# Patient Record
Sex: FEMALE | Race: WHITE | NOT HISPANIC OR LATINO | Employment: OTHER | URBAN - METROPOLITAN AREA
[De-identification: names, ages, dates, MRNs, and addresses within clinical notes are randomized per-mention and may not be internally consistent; named-entity substitution may affect disease eponyms.]

---

## 2023-03-22 LAB
ALANINE AMINOTRANSFERASE (SGPT) (U/L) IN SER/PLAS: 10 U/L (ref 7–45)
ALBUMIN (G/DL) IN SER/PLAS: 4.3 G/DL (ref 3.4–5)
ALKALINE PHOSPHATASE (U/L) IN SER/PLAS: 57 U/L (ref 33–136)
ANION GAP IN SER/PLAS: 11 MMOL/L (ref 10–20)
ASPARTATE AMINOTRANSFERASE (SGOT) (U/L) IN SER/PLAS: 13 U/L (ref 9–39)
BILIRUBIN TOTAL (MG/DL) IN SER/PLAS: 0.7 MG/DL (ref 0–1.2)
CALCIUM (MG/DL) IN SER/PLAS: 9.8 MG/DL (ref 8.6–10.6)
CARBON DIOXIDE, TOTAL (MMOL/L) IN SER/PLAS: 34 MMOL/L (ref 21–32)
CHLORIDE (MMOL/L) IN SER/PLAS: 102 MMOL/L (ref 98–107)
CHOLESTEROL (MG/DL) IN SER/PLAS: 169 MG/DL (ref 0–199)
CHOLESTEROL IN HDL (MG/DL) IN SER/PLAS: 54.4 MG/DL
CHOLESTEROL/HDL RATIO: 3.1
CREATININE (MG/DL) IN SER/PLAS: 0.64 MG/DL (ref 0.5–1.05)
ERYTHROCYTE DISTRIBUTION WIDTH (RATIO) BY AUTOMATED COUNT: 13.8 % (ref 11.5–14.5)
ERYTHROCYTE MEAN CORPUSCULAR HEMOGLOBIN CONCENTRATION (G/DL) BY AUTOMATED: 31.8 G/DL (ref 32–36)
ERYTHROCYTE MEAN CORPUSCULAR VOLUME (FL) BY AUTOMATED COUNT: 87 FL (ref 80–100)
ERYTHROCYTES (10*6/UL) IN BLOOD BY AUTOMATED COUNT: 5.4 X10E12/L (ref 4–5.2)
GFR FEMALE: 88 ML/MIN/1.73M2
GLUCOSE (MG/DL) IN SER/PLAS: 119 MG/DL (ref 74–99)
HEMATOCRIT (%) IN BLOOD BY AUTOMATED COUNT: 47.2 % (ref 36–46)
HEMOGLOBIN (G/DL) IN BLOOD: 15 G/DL (ref 12–16)
LDL: 64 MG/DL (ref 0–99)
LEUKOCYTES (10*3/UL) IN BLOOD BY AUTOMATED COUNT: 7.7 X10E9/L (ref 4.4–11.3)
NON HDL CHOLESTEROL: 115 MG/DL
NRBC (PER 100 WBCS) BY AUTOMATED COUNT: 0 /100 WBC (ref 0–0)
PLATELETS (10*3/UL) IN BLOOD AUTOMATED COUNT: 231 X10E9/L (ref 150–450)
POTASSIUM (MMOL/L) IN SER/PLAS: 4.3 MMOL/L (ref 3.5–5.3)
PROTEIN TOTAL: 6.4 G/DL (ref 6.4–8.2)
SODIUM (MMOL/L) IN SER/PLAS: 143 MMOL/L (ref 136–145)
TRIGLYCERIDE (MG/DL) IN SER/PLAS: 253 MG/DL (ref 0–149)
UREA NITROGEN (MG/DL) IN SER/PLAS: 11 MG/DL (ref 6–23)
VLDL: 51 MG/DL (ref 0–40)

## 2023-03-24 DIAGNOSIS — K21.00 GASTROESOPHAGEAL REFLUX DISEASE WITH ESOPHAGITIS, UNSPECIFIED WHETHER HEMORRHAGE: ICD-10-CM

## 2023-03-24 DIAGNOSIS — I10 HYPERTENSION, UNSPECIFIED TYPE: ICD-10-CM

## 2023-03-24 DIAGNOSIS — Z79.4 OTHER SPECIFIED DIABETES MELLITUS WITH OTHER SPECIFIED COMPLICATION, WITH LONG-TERM CURRENT USE OF INSULIN (MULTI): ICD-10-CM

## 2023-03-24 DIAGNOSIS — E78.5 HYPERLIPIDEMIA, UNSPECIFIED HYPERLIPIDEMIA TYPE: ICD-10-CM

## 2023-03-24 DIAGNOSIS — E13.69 OTHER SPECIFIED DIABETES MELLITUS WITH OTHER SPECIFIED COMPLICATION, WITH LONG-TERM CURRENT USE OF INSULIN (MULTI): ICD-10-CM

## 2023-03-24 DIAGNOSIS — F41.9 ANXIETY: ICD-10-CM

## 2023-03-24 RX ORDER — SIMVASTATIN 20 MG/1
1 TABLET, FILM COATED ORAL NIGHTLY
COMMUNITY
Start: 2012-12-11 | End: 2023-03-27

## 2023-03-24 RX ORDER — PANTOPRAZOLE SODIUM 40 MG/1
40 TABLET, DELAYED RELEASE ORAL 2 TIMES DAILY
Qty: 180 TABLET | Refills: 0 | Status: SHIPPED | OUTPATIENT
Start: 2023-03-24 | End: 2023-07-19 | Stop reason: SDUPTHER

## 2023-03-24 RX ORDER — PANTOPRAZOLE SODIUM 40 MG/1
1 TABLET, DELAYED RELEASE ORAL 2 TIMES DAILY
COMMUNITY
Start: 2021-05-03 | End: 2023-03-24 | Stop reason: SDUPTHER

## 2023-03-24 RX ORDER — CHOLECALCIFEROL (VITAMIN D3) 25 MCG
1 TABLET ORAL DAILY
COMMUNITY

## 2023-03-24 RX ORDER — ROSUVASTATIN CALCIUM 5 MG/1
1 TABLET, COATED ORAL DAILY
COMMUNITY
Start: 2022-10-06 | End: 2023-03-24 | Stop reason: SDUPTHER

## 2023-03-24 RX ORDER — HYDROXYZINE HYDROCHLORIDE 25 MG/1
25 TABLET, FILM COATED ORAL 3 TIMES DAILY PRN
Qty: 270 TABLET | Refills: 0 | Status: SHIPPED
Start: 2023-03-24 | End: 2023-07-19 | Stop reason: ALTCHOICE

## 2023-03-24 RX ORDER — GLUC/MSM/COLGN2/HYAL/ANTIARTH3 375-375-20
27 TABLET ORAL DAILY
COMMUNITY
End: 2023-03-24 | Stop reason: SDUPTHER

## 2023-03-24 RX ORDER — HYDROXYZINE HYDROCHLORIDE 25 MG/1
1 TABLET, FILM COATED ORAL 3 TIMES DAILY PRN
COMMUNITY
Start: 2020-11-02 | End: 2023-03-24 | Stop reason: SDUPTHER

## 2023-03-24 RX ORDER — LOSARTAN POTASSIUM 100 MG/1
100 TABLET ORAL DAILY
Qty: 90 TABLET | Refills: 0 | Status: SHIPPED | OUTPATIENT
Start: 2023-03-24 | End: 2023-07-19 | Stop reason: SDUPTHER

## 2023-03-24 RX ORDER — AMLODIPINE BESYLATE 10 MG/1
10 TABLET ORAL DAILY
Qty: 90 TABLET | Refills: 0 | Status: SHIPPED | OUTPATIENT
Start: 2023-03-24 | End: 2023-07-19 | Stop reason: SDUPTHER

## 2023-03-24 RX ORDER — ROSUVASTATIN CALCIUM 5 MG/1
5 TABLET, COATED ORAL DAILY
Qty: 90 TABLET | Refills: 0 | Status: SHIPPED | OUTPATIENT
Start: 2023-03-24 | End: 2023-07-19 | Stop reason: SDUPTHER

## 2023-03-24 RX ORDER — CELECOXIB 200 MG/1
1 CAPSULE ORAL DAILY PRN
COMMUNITY
Start: 2022-03-25 | End: 2023-07-19 | Stop reason: ALTCHOICE

## 2023-03-24 RX ORDER — SERTRALINE HYDROCHLORIDE 100 MG/1
1 TABLET, FILM COATED ORAL DAILY
COMMUNITY
Start: 2013-01-30 | End: 2023-07-19 | Stop reason: SDUPTHER

## 2023-03-24 RX ORDER — LOSARTAN POTASSIUM 100 MG/1
1 TABLET ORAL DAILY
COMMUNITY
Start: 2020-02-03 | End: 2023-03-24 | Stop reason: SDUPTHER

## 2023-03-24 RX ORDER — GLUC/MSM/COLGN2/HYAL/ANTIARTH3 375-375-20
27 TABLET ORAL DAILY
Qty: 90 TABLET | Refills: 0 | Status: SHIPPED | OUTPATIENT
Start: 2023-03-24 | End: 2023-06-22

## 2023-03-24 RX ORDER — AMLODIPINE BESYLATE 10 MG/1
10 TABLET ORAL DAILY
COMMUNITY
Start: 2023-01-30 | End: 2023-03-24 | Stop reason: SDUPTHER

## 2023-07-07 DIAGNOSIS — E78.5 HYPERLIPIDEMIA, UNSPECIFIED HYPERLIPIDEMIA TYPE: ICD-10-CM

## 2023-07-07 DIAGNOSIS — I10 HYPERTENSION, UNSPECIFIED TYPE: ICD-10-CM

## 2023-07-12 ENCOUNTER — LAB (OUTPATIENT)
Dept: LAB | Facility: LAB | Age: 83
End: 2023-07-12
Payer: MEDICARE

## 2023-07-12 DIAGNOSIS — E78.5 HYPERLIPIDEMIA, UNSPECIFIED HYPERLIPIDEMIA TYPE: ICD-10-CM

## 2023-07-12 DIAGNOSIS — I10 HYPERTENSION, UNSPECIFIED TYPE: ICD-10-CM

## 2023-07-12 LAB
ALANINE AMINOTRANSFERASE (SGPT) (U/L) IN SER/PLAS: 12 U/L (ref 7–45)
ALBUMIN (G/DL) IN SER/PLAS: 4.7 G/DL (ref 3.4–5)
ALKALINE PHOSPHATASE (U/L) IN SER/PLAS: 57 U/L (ref 33–136)
ANION GAP IN SER/PLAS: 11 MMOL/L (ref 10–20)
ASPARTATE AMINOTRANSFERASE (SGOT) (U/L) IN SER/PLAS: 16 U/L (ref 9–39)
BILIRUBIN TOTAL (MG/DL) IN SER/PLAS: 0.7 MG/DL (ref 0–1.2)
CALCIUM (MG/DL) IN SER/PLAS: 10.1 MG/DL (ref 8.6–10.6)
CARBON DIOXIDE, TOTAL (MMOL/L) IN SER/PLAS: 34 MMOL/L (ref 21–32)
CHLORIDE (MMOL/L) IN SER/PLAS: 101 MMOL/L (ref 98–107)
CHOLESTEROL (MG/DL) IN SER/PLAS: 168 MG/DL (ref 0–199)
CHOLESTEROL IN HDL (MG/DL) IN SER/PLAS: 60.1 MG/DL
CHOLESTEROL/HDL RATIO: 2.8
CREATININE (MG/DL) IN SER/PLAS: 0.58 MG/DL (ref 0.5–1.05)
ERYTHROCYTE DISTRIBUTION WIDTH (RATIO) BY AUTOMATED COUNT: 12.7 % (ref 11.5–14.5)
ERYTHROCYTE MEAN CORPUSCULAR HEMOGLOBIN CONCENTRATION (G/DL) BY AUTOMATED: 31 G/DL (ref 32–36)
ERYTHROCYTE MEAN CORPUSCULAR VOLUME (FL) BY AUTOMATED COUNT: 88 FL (ref 80–100)
ERYTHROCYTES (10*6/UL) IN BLOOD BY AUTOMATED COUNT: 5.75 X10E12/L (ref 4–5.2)
GFR FEMALE: 90 ML/MIN/1.73M2
GLUCOSE (MG/DL) IN SER/PLAS: 106 MG/DL (ref 74–99)
HEMATOCRIT (%) IN BLOOD BY AUTOMATED COUNT: 50.6 % (ref 36–46)
HEMOGLOBIN (G/DL) IN BLOOD: 15.7 G/DL (ref 12–16)
LDL: 67 MG/DL (ref 0–99)
LEUKOCYTES (10*3/UL) IN BLOOD BY AUTOMATED COUNT: 6.5 X10E9/L (ref 4.4–11.3)
NON HDL CHOLESTEROL: 108 MG/DL
NRBC (PER 100 WBCS) BY AUTOMATED COUNT: 0 /100 WBC (ref 0–0)
PLATELETS (10*3/UL) IN BLOOD AUTOMATED COUNT: 188 X10E9/L (ref 150–450)
POTASSIUM (MMOL/L) IN SER/PLAS: 4.6 MMOL/L (ref 3.5–5.3)
PROTEIN TOTAL: 6.8 G/DL (ref 6.4–8.2)
SODIUM (MMOL/L) IN SER/PLAS: 141 MMOL/L (ref 136–145)
TRIGLYCERIDE (MG/DL) IN SER/PLAS: 207 MG/DL (ref 0–149)
UREA NITROGEN (MG/DL) IN SER/PLAS: 15 MG/DL (ref 6–23)
VLDL: 41 MG/DL (ref 0–40)

## 2023-07-12 PROCEDURE — 80061 LIPID PANEL: CPT

## 2023-07-12 PROCEDURE — 80053 COMPREHEN METABOLIC PANEL: CPT

## 2023-07-12 PROCEDURE — 85027 COMPLETE CBC AUTOMATED: CPT

## 2023-07-12 PROCEDURE — 36415 COLL VENOUS BLD VENIPUNCTURE: CPT

## 2023-07-13 ENCOUNTER — TELEPHONE (OUTPATIENT)
Dept: PRIMARY CARE | Facility: CLINIC | Age: 83
End: 2023-07-13
Payer: MEDICARE

## 2023-07-19 ENCOUNTER — OFFICE VISIT (OUTPATIENT)
Dept: PRIMARY CARE | Facility: CLINIC | Age: 83
End: 2023-07-19
Payer: MEDICARE

## 2023-07-19 VITALS
BODY MASS INDEX: 28.47 KG/M2 | SYSTOLIC BLOOD PRESSURE: 134 MMHG | WEIGHT: 145 LBS | HEIGHT: 60 IN | DIASTOLIC BLOOD PRESSURE: 70 MMHG

## 2023-07-19 DIAGNOSIS — K21.00 GASTROESOPHAGEAL REFLUX DISEASE WITH ESOPHAGITIS, UNSPECIFIED WHETHER HEMORRHAGE: ICD-10-CM

## 2023-07-19 DIAGNOSIS — I10 HYPERTENSION, UNSPECIFIED TYPE: ICD-10-CM

## 2023-07-19 DIAGNOSIS — F41.9 ANXIETY: ICD-10-CM

## 2023-07-19 DIAGNOSIS — E78.5 HYPERLIPIDEMIA, UNSPECIFIED HYPERLIPIDEMIA TYPE: ICD-10-CM

## 2023-07-19 PROBLEM — N39.0 URINARY TRACT INFECTION: Status: ACTIVE | Noted: 2023-07-19

## 2023-07-19 PROBLEM — D64.9 ANEMIA: Status: ACTIVE | Noted: 2023-07-19

## 2023-07-19 PROBLEM — R10.9 ABDOMINAL PAIN: Status: ACTIVE | Noted: 2023-07-19

## 2023-07-19 PROBLEM — N32.81 OVERACTIVE BLADDER: Status: ACTIVE | Noted: 2023-07-19

## 2023-07-19 PROBLEM — R53.83 FATIGUE: Status: ACTIVE | Noted: 2023-07-19

## 2023-07-19 PROBLEM — E55.9 VITAMIN D DEFICIENCY: Status: ACTIVE | Noted: 2023-07-19

## 2023-07-19 PROBLEM — J18.9 PNEUMONIA: Status: ACTIVE | Noted: 2023-07-19

## 2023-07-19 PROBLEM — K64.9 HEMORRHOIDS: Status: ACTIVE | Noted: 2023-07-19

## 2023-07-19 PROBLEM — M25.559 HIP PAIN: Status: ACTIVE | Noted: 2023-07-19

## 2023-07-19 PROBLEM — M25.562 KNEE PAIN, LEFT: Status: ACTIVE | Noted: 2023-07-19

## 2023-07-19 PROBLEM — R35.0 URINE FREQUENCY: Status: ACTIVE | Noted: 2023-07-19

## 2023-07-19 PROBLEM — M89.9 DISORDER OF BONE AND ARTICULAR CARTILAGE: Status: ACTIVE | Noted: 2023-07-19

## 2023-07-19 PROBLEM — M25.512 LEFT SHOULDER PAIN: Status: ACTIVE | Noted: 2023-07-19

## 2023-07-19 PROBLEM — J44.9 COPD (CHRONIC OBSTRUCTIVE PULMONARY DISEASE) (MULTI): Status: ACTIVE | Noted: 2023-07-19

## 2023-07-19 PROBLEM — J32.9 SINUSITIS: Status: ACTIVE | Noted: 2023-07-19

## 2023-07-19 PROBLEM — R20.0 NUMBNESS AND TINGLING: Status: ACTIVE | Noted: 2023-07-19

## 2023-07-19 PROBLEM — M54.9 BACK PAIN WITH RADIATION: Status: ACTIVE | Noted: 2023-07-19

## 2023-07-19 PROBLEM — R05.9 COUGH: Status: ACTIVE | Noted: 2023-07-19

## 2023-07-19 PROBLEM — K63.5 SIGMOID POLYP: Status: ACTIVE | Noted: 2023-07-19

## 2023-07-19 PROBLEM — J02.9 SORETHROAT: Status: ACTIVE | Noted: 2023-07-19

## 2023-07-19 PROBLEM — J20.9 ACUTE BRONCHITIS: Status: ACTIVE | Noted: 2023-07-19

## 2023-07-19 PROBLEM — G51.39 HEMIFACIAL SPASM: Status: ACTIVE | Noted: 2023-07-19

## 2023-07-19 PROBLEM — R73.9 ELEVATED BLOOD SUGAR: Status: ACTIVE | Noted: 2023-07-19

## 2023-07-19 PROBLEM — L82.1 SEBORRHEIC KERATOSIS: Status: ACTIVE | Noted: 2023-07-19

## 2023-07-19 PROBLEM — K44.9 HIATAL HERNIA: Status: ACTIVE | Noted: 2023-07-19

## 2023-07-19 PROBLEM — M54.50 LOW BACK PAIN: Status: ACTIVE | Noted: 2023-07-19

## 2023-07-19 PROBLEM — D12.0 ADENOMA OF CECUM: Status: ACTIVE | Noted: 2023-07-19

## 2023-07-19 PROBLEM — Z86.2 H/O IRON DEFICIENCY ANEMIA: Status: ACTIVE | Noted: 2023-07-19

## 2023-07-19 PROBLEM — R20.2 NUMBNESS AND TINGLING: Status: ACTIVE | Noted: 2023-07-19

## 2023-07-19 PROBLEM — R25.3 EYELID TWITCH: Status: ACTIVE | Noted: 2023-07-19

## 2023-07-19 PROBLEM — E78.00 HYPERCHOLESTEROLEMIA: Status: ACTIVE | Noted: 2023-07-19

## 2023-07-19 PROBLEM — D50.0 IRON DEFICIENCY ANEMIA DUE TO CHRONIC BLOOD LOSS: Status: ACTIVE | Noted: 2023-07-19

## 2023-07-19 PROBLEM — R92.8 ABNORMAL MAMMOGRAM: Status: ACTIVE | Noted: 2023-07-19

## 2023-07-19 PROBLEM — M94.9 DISORDER OF BONE AND ARTICULAR CARTILAGE: Status: ACTIVE | Noted: 2023-07-19

## 2023-07-19 PROBLEM — R30.0 DYSURIA: Status: ACTIVE | Noted: 2023-07-19

## 2023-07-19 PROBLEM — N30.90 BLADDER INFECTION: Status: ACTIVE | Noted: 2023-07-19

## 2023-07-19 PROBLEM — L71.0 PERIORAL DERMATITIS: Status: ACTIVE | Noted: 2023-07-19

## 2023-07-19 PROBLEM — K92.1 BLOOD IN STOOL: Status: ACTIVE | Noted: 2023-07-19

## 2023-07-19 PROBLEM — G51.31 CLONIC HEMIFACIAL SPASM, RIGHT: Status: ACTIVE | Noted: 2023-07-19

## 2023-07-19 PROBLEM — R06.00 DYSPNEA: Status: ACTIVE | Noted: 2023-07-19

## 2023-07-19 PROBLEM — F32.A DEPRESSION: Status: ACTIVE | Noted: 2023-07-19

## 2023-07-19 PROBLEM — K21.9 GERD (GASTROESOPHAGEAL REFLUX DISEASE): Status: ACTIVE | Noted: 2023-07-19

## 2023-07-19 PROBLEM — G47.00 INSOMNIA: Status: ACTIVE | Noted: 2023-07-19

## 2023-07-19 PROCEDURE — 99213 OFFICE O/P EST LOW 20 MIN: CPT | Performed by: INTERNAL MEDICINE

## 2023-07-19 PROCEDURE — 1159F MED LIST DOCD IN RCRD: CPT | Performed by: INTERNAL MEDICINE

## 2023-07-19 PROCEDURE — 3075F SYST BP GE 130 - 139MM HG: CPT | Performed by: INTERNAL MEDICINE

## 2023-07-19 PROCEDURE — 3078F DIAST BP <80 MM HG: CPT | Performed by: INTERNAL MEDICINE

## 2023-07-19 RX ORDER — LOSARTAN POTASSIUM 100 MG/1
100 TABLET ORAL DAILY
Qty: 90 TABLET | Refills: 1 | Status: SHIPPED | OUTPATIENT
Start: 2023-07-19 | End: 2024-02-20 | Stop reason: SDUPTHER

## 2023-07-19 RX ORDER — PANTOPRAZOLE SODIUM 40 MG/1
40 TABLET, DELAYED RELEASE ORAL 2 TIMES DAILY
Qty: 180 TABLET | Refills: 1 | Status: SHIPPED | OUTPATIENT
Start: 2023-07-19 | End: 2024-02-20 | Stop reason: SDUPTHER

## 2023-07-19 RX ORDER — ROSUVASTATIN CALCIUM 5 MG/1
5 TABLET, COATED ORAL DAILY
Qty: 90 TABLET | Refills: 1 | Status: SHIPPED | OUTPATIENT
Start: 2023-07-19 | End: 2024-02-20 | Stop reason: SDUPTHER

## 2023-07-19 RX ORDER — ALPRAZOLAM 0.25 MG/1
0.25 TABLET ORAL 3 TIMES DAILY PRN
Qty: 30 TABLET | Refills: 0 | Status: SHIPPED | OUTPATIENT
Start: 2023-07-19 | End: 2024-03-15

## 2023-07-19 RX ORDER — AMLODIPINE BESYLATE 10 MG/1
10 TABLET ORAL DAILY
Qty: 90 TABLET | Refills: 1 | Status: SHIPPED | OUTPATIENT
Start: 2023-07-19 | End: 2024-02-20 | Stop reason: SDUPTHER

## 2023-07-19 RX ORDER — SERTRALINE HYDROCHLORIDE 100 MG/1
100 TABLET, FILM COATED ORAL DAILY
Qty: 90 TABLET | Refills: 1 | Status: SHIPPED | OUTPATIENT
Start: 2023-07-19 | End: 2024-02-20 | Stop reason: SDUPTHER

## 2023-07-19 NOTE — PROGRESS NOTES
Subjective   Patient ID: Slime Alonzo is a 82 y.o. female who presents for Follow-up (results).    HPI   Patient is here for follow-up  Follow-up on hypertension high cholesterol COPD  She is not using oxygen and wondering if she needs to use it   She does feel very tired fatigue   Doing better with anxiety     patient was hospitalized for pneumonia and COPD exacerbation  She is discharged on oxygen on exertion  Follow-up on hypertension and high cholesterol  Patient is asking for handicap sticker as she gets short of breath on exertion    Patient here for follow-up on blood work  Follow-up on hypertension high cholesterol COPD  She is having some breathing issues but not using the inhaler  past recap      Patient is here for follow-up on hypertension  Did blood work  Patient is very unhappy because of pandemic she is feeling very tired next and she has no motivation to move around  She has incontinence of bladder and overactive bladder but cannot afford the medication  She has COPD but does not do any breathing exercises And she does not use inhaler because they're expensive  She gets out of breath on exertion  Wants prescription for Xanax but does not want to sign for contract narcotic contract     Patient here for follow-up on hypertension  Wants refills on Xanax uses it very occasionally  Follow-up on blood work  Complaining of rash on the face on the eyelids  Could not afford oxybutynin so not taking it still having bladder issues  Patient still very upset about having the spasms on the right side of the face        Patient is here for follow-up did blood work. Questions about vitamin D. Blood pressure is running high recently. Runny nose. Leaky bladder she had sling surgery in the past but does not want to consider the surgery of the  Having tingling in the fingers she suspects carpal tunnel but she is refusing EMG  Getting some red blotches on the face  Left arm still hurts no trauma but she gets painful  when tries to raise it above shoulder  She had multiple colonoscopy to remove fundic all last one showed small for polyps  Son had CABG     pasr recap  Patient had seen the GI underwent colonoscopy found to have multiple large polyps she was referred to other gastroenterologist to remove 1 polyp but could not do the other so she was referred again to another specialist surgeon who referred her back to her different gastroenterologist she was able to remove the bigger polyp.  Pathology showed precancerous polyps  Patient has a follow-up in 6 months with the gastroenterologist  She is here for follow-up on blood work  Follow-up on hypertension COPD hyperlipidemia  She is also due for mammogram wants prescription for Xanax        PAST RECALL  Patient is here with concerns about having blood in the stool  She noticed a few blood clots small in the morning then she is noticing blood on the toilet paper she gets little constipated denies any abdominal pain  She had colonoscopy about 8 years ago which was normal  Here for follow-up on hypertension and COPD high cholesterol  Did blood work needs medication refill      Review of Systems    Objective   /70   Ht 1.524 m (5')   Wt 65.8 kg (145 lb)   BMI 28.32 kg/m²     Physical Exam  Vitals reviewed.   Constitutional:       Appearance: Normal appearance.   HENT:      Head: Normocephalic and atraumatic.      Right Ear: Tympanic membrane, ear canal and external ear normal.      Left Ear: Tympanic membrane, ear canal and external ear normal.      Nose: Nose normal.      Mouth/Throat:      Pharynx: Oropharynx is clear.   Eyes:      Extraocular Movements: Extraocular movements intact.      Conjunctiva/sclera: Conjunctivae normal.      Pupils: Pupils are equal, round, and reactive to light.   Cardiovascular:      Rate and Rhythm: Normal rate and regular rhythm.      Pulses: Normal pulses.      Heart sounds: Normal heart sounds.   Pulmonary:      Effort: Pulmonary effort is  normal.      Breath sounds: Normal breath sounds.   Abdominal:      General: Abdomen is flat. Bowel sounds are normal.      Palpations: Abdomen is soft.   Musculoskeletal:      Cervical back: Normal range of motion and neck supple.   Skin:     General: Skin is warm and dry.   Neurological:      General: No focal deficit present.      Mental Status: She is alert and oriented to person, place, and time.   Psychiatric:         Mood and Affect: Mood normal.       Assessment/Plan   Problem List Items Addressed This Visit          Cardiac and Vasculature    Hyperlipidemia    Relevant Medications    rosuvastatin (Crestor) 5 mg tablet    Other Relevant Orders    CBC    Comprehensive Metabolic Panel    Thyroid Stimulating Hormone    Lipid Panel       Gastrointestinal and Abdominal    GERD (gastroesophageal reflux disease)    Relevant Medications    pantoprazole (ProtoNix) 40 mg EC tablet    Other Relevant Orders    CBC    Comprehensive Metabolic Panel    Thyroid Stimulating Hormone    Lipid Panel       Mental Health    Anxiety    Relevant Medications    sertraline (Zoloft) 100 mg tablet    ALPRAZolam (Xanax) 0.25 mg tablet     Other Visit Diagnoses       Hypertension, unspecified type        Relevant Medications    amLODIPine (Norvasc) 10 mg tablet    losartan (Cozaar) 100 mg tablet    Other Relevant Orders    CBC    Comprehensive Metabolic Panel    Thyroid Stimulating Hormone    Lipid Panel          4/27  Narcotic contract and  Xanax occasional use  Patient used 30 tablets last year but wants more  30 tablets with 1 refill given  [facility] report done  Stat CAT scan ordered for abdomen and pelvis  Repeat CBC ordered because patient thinks that her anemia is probably a lab error  Patient has history of 2 big polyps in the colon  She may need to get the GI doctor again  Medications refilled  Encourage patient to do blood work in 3 months  She should also get evaluated for cardiac etiology because there is lots of atherosclerotic  changes in her artery  Follow-up after CAT scan     5/3  CAT scan results reviewed  Patient has large hiatal hernia  Anemia most likely related to hiatal hernia  Continue Protonix 40 mg twice a day  Urgent appointment made with GI for tomorrow  Patient needs EGD  Patient is very anxious all her questions answered     3/25  Stat x-ray of the knee done  Shows mild joint effusion  Possibly patient had meniscal tear  Refer to orthopedic will benefit from steroid injection  Recent blood work reviewed  Blood pressure stable  Cholesterol well controlled  Prescription for Xanax given for emergency use  [facility] report     10/6/22  Flu shot given  Blood work reviewed  This drop in H&H again  Patient does not want to pursue further  Start taking iron supplements  Advised patient to repeat the blood work in couple of weeks but she is not willing to  Left message with [place] [phone]  Added Spiriva  Flu shot given  Blood pressure is also very high  Patient says she gets nervous and anxious otherwise her blood pressure is good  Does not want to make changes  Follow-up in 3 months     Total time spent in visit more than 50 minutes more than 50% time in face-to-face counseling     2/17/23  Clinically patient is doing better  Pneumonia is improving  Handicap sticker given  Blood pressure is doing better  Blood work ordered CBC CMP fasting lipid  Follow-up in a month  Discussed lifestyle modification    7/19/2020    Clinically patient looks better  Blood work looks improved  Blood pressure is stable  She is upset about needing Botox shot  Her anxiety is and questions answered  Follow-up blood work in 6 months  Triglycerides little high discussed diet and exercise  Medications refilled

## 2023-07-31 DIAGNOSIS — R92.8 ABNORMAL MAMMOGRAM: ICD-10-CM

## 2023-07-31 DIAGNOSIS — Z12.31 ENCOUNTER FOR SCREENING MAMMOGRAM FOR BREAST CANCER: ICD-10-CM

## 2023-08-19 RX ORDER — HYDROXYZINE HYDROCHLORIDE 25 MG/1
25 TABLET, FILM COATED ORAL 3 TIMES DAILY PRN
COMMUNITY

## 2023-08-19 RX ORDER — ALBUTEROL SULFATE 90 UG/1
AEROSOL, METERED RESPIRATORY (INHALATION)
COMMUNITY

## 2023-08-19 RX ORDER — TIOTROPIUM BROMIDE INHALATION SPRAY 3.12 UG/1
2 SPRAY, METERED RESPIRATORY (INHALATION) DAILY
COMMUNITY

## 2023-08-19 RX ORDER — FERROUS SULFATE 325(65) MG
65 TABLET, DELAYED RELEASE (ENTERIC COATED) ORAL DAILY
COMMUNITY

## 2023-10-02 ENCOUNTER — PROCEDURE VISIT (OUTPATIENT)
Dept: NEUROLOGY | Facility: CLINIC | Age: 83
End: 2023-10-02
Payer: MEDICARE

## 2023-10-02 DIAGNOSIS — G51.31 CLONIC HEMIFACIAL SPASM, RIGHT: Primary | ICD-10-CM

## 2023-10-02 PROCEDURE — 64612 DESTROY NERVE FACE MUSCLE: CPT | Performed by: PSYCHIATRY & NEUROLOGY

## 2023-10-02 NOTE — PROGRESS NOTES
[facility] / BOTULINUM TOXIN CLINIC     Patient reports that the last injections worked better after pretarsal injections added. Wore off a few weeks ago. Has eye closure when she eats and right facial pulling. Previously had facial weakness from higher doses but no ptosis or upper face side effects. She is generally bothered by the facial weakness, but does not think it is any worse after her last botox injections.    Exam: R eye clonic spasms and R facial pulling      Procedure  Prior to the start of the procedure a time out was taken and the following were verified: the identity of the patient using two patient identifiers (name, ) - this was verified by the patient    The patient was prepped in the usual sterile fashion. OnabotulinumtoxinA (Botox) was injected as follows:    Total dose    Muscle    6 units right superomedial orbicularis oculi   6 units  right superolateral orbicularis oculi   10 units  right lateral orbicularis oculi   10 units  right inferolateral orbicularis oculi   10 units  right inferior orbicularis oculi   2 units right pretarsal (far medial on eyelid 1 unit and far lateral 1 unit)  0.5 unit right zygomaticus  1 unit right mentalis    Total amount of botulinum toxin used was 45.5 units.  Total amount discarded was 54.5 units.  Total billed was 100 units.    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 She will return in approximately 3 months for repeat injections.

## 2023-10-25 ENCOUNTER — TELEPHONE (OUTPATIENT)
Dept: SURGERY | Facility: CLINIC | Age: 83
End: 2023-10-25

## 2023-10-25 ENCOUNTER — TELEPHONE (OUTPATIENT)
Dept: SURGERY | Facility: CLINIC | Age: 83
End: 2023-10-25
Payer: MEDICARE

## 2023-10-25 DIAGNOSIS — K44.9 HIATAL HERNIA: ICD-10-CM

## 2023-11-03 ENCOUNTER — HOSPITAL ENCOUNTER (OUTPATIENT)
Dept: RADIOLOGY | Facility: HOSPITAL | Age: 83
Discharge: HOME | End: 2023-11-03
Payer: MEDICARE

## 2023-11-03 DIAGNOSIS — K44.9 HIATAL HERNIA: ICD-10-CM

## 2023-11-03 PROCEDURE — 3430000001 HC RX 343 DIAGNOSTIC RADIOPHARMACEUTICALS: Performed by: SURGERY

## 2023-11-03 PROCEDURE — 2500000001 HC RX 250 WO HCPCS SELF ADMINISTERED DRUGS (ALT 637 FOR MEDICARE OP): Performed by: SURGERY

## 2023-11-03 PROCEDURE — 74240 X-RAY XM UPR GI TRC 1CNTRST: CPT

## 2023-11-03 RX ADMIN — BARIUM SULFATE 200 G: 980 POWDER, FOR SUSPENSION ORAL at 09:54

## 2023-11-03 RX ADMIN — BARIUM SULFATE 100 ML: 0.6 SUSPENSION ORAL at 09:52

## 2023-11-03 RX ADMIN — ANTACID/ANTIFLATULENT 1 PACKET: 380; 550; 10; 10 GRANULE, EFFERVESCENT ORAL at 09:54

## 2023-11-09 NOTE — PROGRESS NOTES
Subjective   Patient ID: Slime Alonzo is a 83 y.o. female who presents for No chief complaint on file..  HPI  Grandson met you RJ interested in bariatric surgery dad Abhi is (son in law) here today son in [place] Slime/ Yohana(daughter)  NP [facility] REFERRAL DR. ADAMS  HT: 60 INCHES IDEAL WT: 130  UGI IN EPIC  Review of Systems     Allergy/Immunologic:          HIV / AIDS No.  Hepatitis A No.  Hepatitis B No.  Hepatitis C No.  Immunosuppressent drugs No.         HEENT:          Headache negative.         CARDIOLOGY:          History of Hyperlipidemia No.  Last stress test NEVER.  Last echocardiogram 2023.  Chest pain No.  High blood pressure YES.  Irregular heart beat No.  Known coronary artery disease No.  Pacemaker No.  Palpitations No.         RESPIRATORY:          Hx steroid use No.  ER visits or Hospitalizations for breathing problems No.  Sleep Apnea No.  CRUZ (dyspnea on exertion) YES.  Hx of Asthma/COPD No.         GASTROENTEROLOGY:          Peptic ulcer No, Last EGD N/A, Last UGI N/A.  Colonoscopy Last Colonoscopy N/A.  Heartburn YES.         ENDOCRINOLOGY:          Diabetes No.  Thyroid disorder No.         EXTREMITIES:          Varicose Veins No.  Stasis Ulcers No.  Ankle swelling YES.  Personal history DVT No.  Personal history PE No.  Personal history of other thrombolic events No.  Family history of VTE No.  Known genetic bleeding or clotting disorder No.         FEMALE REPRODUCTIVE:          Uterine fibroids No.  Ovarian Cyst No.  Infertility No.  Menstrual history HYSTERECTOMY         UROLOGY:          Kidney disease No.  Kidney stones No.  Previous UTIs No.  Urinary incontinence No.         MUSCULOSKELETAL:          Osteoporosis/Osteopenia No.  Arthritis YES  Joint pain YES.         SKIN:          Hidradenitis No.  Open skin wounds No.  Rosacea No.  Healing problems No.         PSYCHOLOGY:          Anxiety none.  Depression none.  Eating disorder denies.     Objective   Physical  Exam    Assessment/Plan

## 2023-11-10 ENCOUNTER — OFFICE VISIT (OUTPATIENT)
Dept: SURGERY | Facility: CLINIC | Age: 83
End: 2023-11-10
Payer: MEDICARE

## 2023-11-10 VITALS
HEIGHT: 60 IN | BODY MASS INDEX: 28.66 KG/M2 | DIASTOLIC BLOOD PRESSURE: 78 MMHG | HEART RATE: 69 BPM | SYSTOLIC BLOOD PRESSURE: 175 MMHG | WEIGHT: 146 LBS

## 2023-11-10 DIAGNOSIS — J44.9 CHRONIC OBSTRUCTIVE PULMONARY DISEASE, UNSPECIFIED COPD TYPE (MULTI): ICD-10-CM

## 2023-11-10 DIAGNOSIS — E78.00 HYPERCHOLESTEROLEMIA: ICD-10-CM

## 2023-11-10 DIAGNOSIS — K21.9 GASTROESOPHAGEAL REFLUX DISEASE, UNSPECIFIED WHETHER ESOPHAGITIS PRESENT: ICD-10-CM

## 2023-11-10 DIAGNOSIS — K44.9 PARAESOPHAGEAL HERNIA: Primary | ICD-10-CM

## 2023-11-10 DIAGNOSIS — I10 BENIGN ESSENTIAL HYPERTENSION: ICD-10-CM

## 2023-11-10 PROCEDURE — 1159F MED LIST DOCD IN RCRD: CPT | Performed by: SURGERY

## 2023-11-10 PROCEDURE — 1126F AMNT PAIN NOTED NONE PRSNT: CPT | Performed by: SURGERY

## 2023-11-10 PROCEDURE — 3077F SYST BP >= 140 MM HG: CPT | Performed by: SURGERY

## 2023-11-10 PROCEDURE — 1036F TOBACCO NON-USER: CPT | Performed by: SURGERY

## 2023-11-10 PROCEDURE — 3078F DIAST BP <80 MM HG: CPT | Performed by: SURGERY

## 2023-11-10 PROCEDURE — 99205 OFFICE O/P NEW HI 60 MIN: CPT | Performed by: SURGERY

## 2023-11-10 ASSESSMENT — LIFESTYLE VARIABLES
HOW OFTEN DO YOU HAVE A DRINK CONTAINING ALCOHOL: MONTHLY OR LESS
HOW OFTEN DO YOU HAVE SIX OR MORE DRINKS ON ONE OCCASION: NEVER
SKIP TO QUESTIONS 9-10: 1
HOW MANY STANDARD DRINKS CONTAINING ALCOHOL DO YOU HAVE ON A TYPICAL DAY: 1 OR 2
AUDIT-C TOTAL SCORE: 1

## 2023-11-10 ASSESSMENT — PATIENT HEALTH QUESTIONNAIRE - PHQ9
1. LITTLE INTEREST OR PLEASURE IN DOING THINGS: NOT AT ALL
SUM OF ALL RESPONSES TO PHQ9 QUESTIONS 1 AND 2: 0
2. FEELING DOWN, DEPRESSED OR HOPELESS: NOT AT ALL

## 2023-11-10 ASSESSMENT — COLUMBIA-SUICIDE SEVERITY RATING SCALE - C-SSRS
1. IN THE PAST MONTH, HAVE YOU WISHED YOU WERE DEAD OR WISHED YOU COULD GO TO SLEEP AND NOT WAKE UP?: NO
2. HAVE YOU ACTUALLY HAD ANY THOUGHTS OF KILLING YOURSELF?: NO
6. HAVE YOU EVER DONE ANYTHING, STARTED TO DO ANYTHING, OR PREPARED TO DO ANYTHING TO END YOUR LIFE?: NO

## 2023-11-10 ASSESSMENT — PAIN SCALES - GENERAL: PAINLEVEL: 0-NO PAIN

## 2023-11-10 ASSESSMENT — ENCOUNTER SYMPTOMS
DEPRESSION: 0
LOSS OF SENSATION IN FEET: 0
OCCASIONAL FEELINGS OF UNSTEADINESS: 0

## 2023-11-10 NOTE — H&P
History Of Present Illness  Slime Alonzo is a 83 y.o. woman referred for a large paraesophageal hernia.  Slime has had reflux for at least 15 years.  She would vomit in attempt to relieve her symptoms.  She has had progression of her symptoms.  She had an EGD 2 years ago for anemia.  She was found to have a hiatal hernia.  She was told to take a ppi which she did not.  Her symptoms progressed and she started taking her ppi a year ago.  She has breakthrough symptoms once a month and will take a second ppi.  She has no nocturnal symptoms.  She does not regurgitate her foods.  She has no dysphagia to foods.  She had an upper GI for this visit.  I reviewed the images with Slime and her family.  Reviewed the upper endoscopy that she had by Dr. Lowe on May 27, 2021.     Past Medical History  Past Medical History:   Diagnosis Date    Depression, unspecified 06/03/2015    Depression    Essential (primary) hypertension 10/29/2022    Benign essential hypertension    Personal history of other diseases of the respiratory system     History of chronic obstructive lung disease    Personal history of other medical treatment 07/15/2019    History of screening mammography    Personal history of other medical treatment 05/26/2018    History of screening mammography    Pure hypercholesterolemia, unspecified 10/06/2022    Hypercholesterolemia       Surgical History  Past Surgical History:   Procedure Laterality Date    BREAST BIOPSY  10/12/2016    Biopsy Breast Open    HYSTERECTOMY  05/16/2013    Hysterectomy    OTHER SURGICAL HISTORY  10/12/2016    Abdominal Surgery        Social History  She has no history on file for tobacco use, alcohol use, and drug use.    Family History  Family History   Problem Relation Name Age of Onset    Other (laryngeal cancer) Mother      Lung disease Father      Stroke Brother          Allergies  Patient has no known allergies.    Review of Systems    Allergy/Immunologic:          HIV / AIDS  No.  Hepatitis A No.  Hepatitis B No.  Hepatitis C No.  Immunosuppressent drugs No.         HEENT:          Headache negative.         CARDIOLOGY:          History of Hyperlipidemia No.  Last stress test NEVER.  Last echocardiogram 2023.  Chest pain No.  High blood pressure YES.  Irregular heart beat No.  Known coronary artery disease No.  Pacemaker No.  Palpitations No.         RESPIRATORY:          Hx steroid use No.  ER visits or Hospitalizations for breathing problems No.  Sleep Apnea No.  CRUZ (dyspnea on exertion) YES.  Hx of Asthma/COPD No.         GASTROENTEROLOGY:          Peptic ulcer No, Last EGD N/A, Last UGI N/A.  Colonoscopy Last Colonoscopy N/A.  Heartburn YES.         ENDOCRINOLOGY:          Diabetes No.  Thyroid disorder No.         EXTREMITIES:          Varicose Veins No.  Stasis Ulcers No.  Ankle swelling YES.  Personal history DVT No.  Personal history PE No.  Personal history of other thrombolic events No.  Family history of VTE No.  Known genetic bleeding or clotting disorder No.         FEMALE REPRODUCTIVE:          Uterine fibroids No.  Ovarian Cyst No.  Infertility No.  Menstrual history HYSTERECTOMY         UROLOGY:          Kidney disease No.  Kidney stones No.  Previous UTIs No.  Urinary incontinence No.         MUSCULOSKELETAL:          Osteoporosis/Osteopenia No.  Arthritis YES  Joint pain YES.         SKIN:          Hidradenitis No.  Open skin wounds No.  Rosacea No.  Healing problems No.         PSYCHOLOGY:          Anxiety none.  Depression none.  Eating disorder denies.    Physical Exam       General Examination:         GENERAL APPEARANCE: alert and oriented x 3, Pleasant and cooperative, No Acute Distress.          HEENT: PERRLA.          NECK: no lymphadenopathy, no thyromegaly, no JVD, normal flexion, normal extension.          HEART: regular rate and rhythm.          LUNGS: clear to auscultation bilaterally.          CHEST: normal shape and expansion.          ABDOMEN: lower  midline scar from prev laparotomy, no hernias present, soft and not tender, no guarding, no CVA tenderness.          EXTREMITIES: pulses 2 plus bilaterally, trace bilateral edema, no ulcerations.             Last Recorded Vitals  Blood pressure 175/78, pulse 69, height 1.524 m (5'), weight 66.2 kg (146 lb).    Relevant Results  FL upper GI w KUB    Addendum Date: 11/3/2023    Interpreted By:  Moris Holcomb, ADDENDUM: Images: 12 spots, 6 series   Dose: 129.1 mGy air kerma   Signed by: Moris Holcomb 11/3/2023 10:47 AM   -------- ORIGINAL REPORT -------- Dictation workstation:   [id number]    Result Date: 11/3/2023  Interpreted By:  Moris Holcomb, STUDY: FL UPPER GI W KUB;  11/3/2023 9:45 am   INDICATION: Signs/Symptoms:hiatal hernia. 83-year-old woman with history of hiatal hernia.   COMPARISON: CT chest 01/25/2023   ACCESSION NUMBER(S): [id number]   ORDERING CLINICIAN: ADITYA FIGUEROA   TECHNIQUE: An initial radiograph of the abdomen and pelvis was obtained.  This was followed by double contrast upper GI study without small-bowel follow-through. Multiple dynamic and static fluoroscopic images of the esophagus, stomach and duodenum were obtained.   FINDINGS:  KUB: Initial  image demonstrates  no abnormally dilated loops of large or small bowel. Free air is not excluded on the basis of this examination. Incidentally noted aortic vascular calcifications. There are degenerative changes of the lumbar spine and bilateral hips with mild lumbar scoliosis.   Upper GI: Fluoroscopic evaluation of the cervical esophagus showed no sign of aspiration or penetration of thick barium.   Fluoroscopic and radiographic evaluation of the thoracic esophagus shows normal esophageal distensibility. There is decreased primary peristalsis with prominent tertiary contractions identified spontaneously in the esophagus. Multiple swallows are required to propel each bolus into the stomach via a patent gastroesophageal  junction. Gastroesophageal reflux is seen to the level of the upper esophagus, both spontaneously and with Valsalva maneuver. There is a moderate-to-large hiatal hernia measuring up to 7.3 x 7.3 cm on the current study.   The distal stomach and proximal duodenum are grossly unremarkable in appearance.         1. Presbyesophagus with decreased primary peristalsis and prominent tertiary contractions. Multiple swallows are required to propel each bolus into the stomach via the patent gastroesophageal junction.   2. Moderate-to-large hiatal hernia measuring up to 7.3 x 7.3 cm.   3. Gastroesophageal reflux is seen up to the level of the upper esophagus, both spontaneously and with Valsalva maneuver.   MACRO: None   Signed by: Moris Holcomb 11/3/2023 10:31 AM Dictation workstation:   [id number]         Assessment/Plan   Problem List Items Addressed This Visit             ICD-10-CM       Cardiac and Vasculature    Benign essential hypertension I10    Hypercholesterolemia E78.00       Gastrointestinal and Abdominal    GERD (gastroesophageal reflux disease) K21.9    Paraesophageal hernia - Primary K44.9       Pulmonary and Pneumonias    COPD (chronic obstructive pulmonary disease) (CMS/[facility]) J44.9       I offered Slime a laparoscopic paraesophageal hernia repair with absorbable mesh reinforcement of crural repair followed by a fundoplication.  She asked me if she had to have the surgery.  I explained to her that this is elective surgery based on her symptoms.  I explained that it would be her decision on whether or not she proceeded with surgery to improve the quality of her life and decrease the likelihood of her having an aspiration pneumonia.  I explained that at her age she was at increased risk for perioperative complications.  Does have a history of COPD if she has a 45-year smoking history, as well as a history of hypertension and hypercholesterolemia.  She and her children asked me questions all the questions  to the best of my ability.  We did discuss the operative risks.  We discussed my experience with the surgery.  Discussed that this procedure is not uncommon in her age group.  Will call with any additional questions.       I spent 60 minutes in the professional and overall care of this patient.      Amor Conner MD

## 2023-11-30 ENCOUNTER — TELEPHONE (OUTPATIENT)
Dept: PRIMARY CARE | Facility: CLINIC | Age: 83
End: 2023-11-30
Payer: MEDICARE

## 2023-11-30 NOTE — TELEPHONE ENCOUNTER
----- Message from Reina Correia CMA sent at 11/29/2023 11:27 AM EST -----  Regarding: oxygen  Patient called regarding needing a new script for [facility]. I called and left her a voicemail to return our call. It looks like she needs a follow up. Please call her

## 2024-01-08 ENCOUNTER — PROCEDURE VISIT (OUTPATIENT)
Dept: NEUROLOGY | Facility: CLINIC | Age: 84
End: 2024-01-08
Payer: MEDICARE

## 2024-01-08 VITALS — WEIGHT: 148 LBS | BODY MASS INDEX: 28.9 KG/M2

## 2024-01-08 DIAGNOSIS — G24.4 MEIGE SYNDROME (BLEPHAROSPASM WITH OROMANDIBULAR DYSTONIA): ICD-10-CM

## 2024-01-08 DIAGNOSIS — G51.31 CLONIC HEMIFACIAL SPASM, RIGHT: Primary | ICD-10-CM

## 2024-01-08 PROCEDURE — 64612 DESTROY NERVE FACE MUSCLE: CPT | Performed by: PSYCHIATRY & NEUROLOGY

## 2024-01-08 NOTE — PROGRESS NOTES
Subjective   Slime Alonzo is an 83 y.o. female here for medical botulinum injection for hemifacial spasm.    MOVEMENT DISORDERS CENTER / BOTULINUM TOXIN CLINIC     Patient reports that the last injections worked better after pretarsal injections added. Wore off a few weeks ago. Has eye closure when she eats and right facial pulling. Previously had facial weakness from higher doses but no ptosis or upper face side effects. She is generally bothered by the facial weakness, but does not think it is any worse after her last botox injections.  Nevertheless, she decided to skip the zygomaticus injection today to see if it makes a difference.  We had a long discussion.    Objective   Neurological Exam    Exam: R eye clonic spasms and R facial pulling   Physical Exam  Procedures    informed consent:  The risks, benefits, and alternatives of onabotulinumtoxinA (Botox) injection were discussed.  The risks that were discussed include bleeding, infection, damage to local structures, over-weakness or under-weakness including ptosis, facial droop, or diplopia, dysphagia, and rare incidents of systemic side effects including dysphagia or allergic reaction.  The alternatives that were discussed include no treatment at all.  The patient gave written informed consent for this procedure    Prior to the start of the procedure a time out was taken and the following were verified: the identity of the patient using two patient identifiers (name, ) - this was verified by the patient    The patient was prepped in the usual sterile fashion. OnabotulinumtoxinA (Botox) was injected as follows:    Total dose    Muscle    6 units right superomedial orbicularis oculi   6 units  right superolateral orbicularis oculi   10 units  right lateral orbicularis oculi   10 units  right inferolateral orbicularis oculi   10 units  right inferior orbicularis oculi   2 units right pretarsal (far medial on eyelid 1 unit and far lateral 1 unit)  0 unit  right zygomaticus (-0.5)  1 unit right mentalis    Total amount of botulinum toxin used was 45 units.  Total amount discarded was 55 units.  Total billed was 100 units.    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 She will return in approximately 3 months for repeat injections.

## 2024-02-06 ENCOUNTER — LAB (OUTPATIENT)
Dept: LAB | Facility: LAB | Age: 84
End: 2024-02-06
Payer: MEDICARE

## 2024-02-06 DIAGNOSIS — K21.00 GASTROESOPHAGEAL REFLUX DISEASE WITH ESOPHAGITIS, UNSPECIFIED WHETHER HEMORRHAGE: ICD-10-CM

## 2024-02-06 DIAGNOSIS — I10 HYPERTENSION, UNSPECIFIED TYPE: ICD-10-CM

## 2024-02-06 DIAGNOSIS — E78.5 HYPERLIPIDEMIA, UNSPECIFIED HYPERLIPIDEMIA TYPE: ICD-10-CM

## 2024-02-06 LAB — TSH SERPL-ACNC: 2.16 MIU/L (ref 0.44–3.98)

## 2024-02-06 PROCEDURE — 36415 COLL VENOUS BLD VENIPUNCTURE: CPT

## 2024-02-06 PROCEDURE — 80053 COMPREHEN METABOLIC PANEL: CPT

## 2024-02-06 PROCEDURE — 85027 COMPLETE CBC AUTOMATED: CPT

## 2024-02-06 PROCEDURE — 84443 ASSAY THYROID STIM HORMONE: CPT

## 2024-02-06 PROCEDURE — 80061 LIPID PANEL: CPT

## 2024-02-07 LAB
ALBUMIN SERPL BCP-MCNC: 4.7 G/DL (ref 3.4–5)
ALP SERPL-CCNC: 51 U/L (ref 33–136)
ALT SERPL W P-5'-P-CCNC: 12 U/L (ref 7–45)
ANION GAP SERPL CALC-SCNC: 11 MMOL/L (ref 10–20)
AST SERPL W P-5'-P-CCNC: 13 U/L (ref 9–39)
BILIRUB SERPL-MCNC: 0.6 MG/DL (ref 0–1.2)
BUN SERPL-MCNC: 14 MG/DL (ref 6–23)
CALCIUM SERPL-MCNC: 9.9 MG/DL (ref 8.6–10.6)
CHLORIDE SERPL-SCNC: 103 MMOL/L (ref 98–107)
CHOLEST SERPL-MCNC: 163 MG/DL (ref 0–199)
CHOLESTEROL/HDL RATIO: 2.7
CO2 SERPL-SCNC: 33 MMOL/L (ref 21–32)
CREAT SERPL-MCNC: 0.64 MG/DL (ref 0.5–1.05)
EGFRCR SERPLBLD CKD-EPI 2021: 88 ML/MIN/1.73M*2
ERYTHROCYTE [DISTWIDTH] IN BLOOD BY AUTOMATED COUNT: 12.8 % (ref 11.5–14.5)
GLUCOSE SERPL-MCNC: 110 MG/DL (ref 74–99)
HCT VFR BLD AUTO: 47.9 % (ref 36–46)
HDLC SERPL-MCNC: 59.8 MG/DL
HGB BLD-MCNC: 15.3 G/DL (ref 12–16)
LDLC SERPL CALC-MCNC: 61 MG/DL
MCH RBC QN AUTO: 28.2 PG (ref 26–34)
MCHC RBC AUTO-ENTMCNC: 31.9 G/DL (ref 32–36)
MCV RBC AUTO: 88 FL (ref 80–100)
NON HDL CHOLESTEROL: 103 MG/DL (ref 0–149)
NRBC BLD-RTO: 0 /100 WBCS (ref 0–0)
PLATELET # BLD AUTO: 171 X10*3/UL (ref 150–450)
POTASSIUM SERPL-SCNC: 4.2 MMOL/L (ref 3.5–5.3)
PROT SERPL-MCNC: 6.6 G/DL (ref 6.4–8.2)
RBC # BLD AUTO: 5.43 X10*6/UL (ref 4–5.2)
SODIUM SERPL-SCNC: 143 MMOL/L (ref 136–145)
TRIGL SERPL-MCNC: 209 MG/DL (ref 0–149)
VLDL: 42 MG/DL (ref 0–40)
WBC # BLD AUTO: 6 X10*3/UL (ref 4.4–11.3)

## 2024-02-20 ENCOUNTER — OFFICE VISIT (OUTPATIENT)
Dept: PRIMARY CARE | Facility: CLINIC | Age: 84
End: 2024-02-20
Payer: MEDICARE

## 2024-02-20 VITALS
HEIGHT: 61 IN | BODY MASS INDEX: 28.47 KG/M2 | SYSTOLIC BLOOD PRESSURE: 134 MMHG | DIASTOLIC BLOOD PRESSURE: 66 MMHG | WEIGHT: 150.8 LBS

## 2024-02-20 DIAGNOSIS — K21.00 GASTROESOPHAGEAL REFLUX DISEASE WITH ESOPHAGITIS, UNSPECIFIED WHETHER HEMORRHAGE: ICD-10-CM

## 2024-02-20 DIAGNOSIS — F41.9 ANXIETY: ICD-10-CM

## 2024-02-20 DIAGNOSIS — E78.5 HYPERLIPIDEMIA, UNSPECIFIED HYPERLIPIDEMIA TYPE: ICD-10-CM

## 2024-02-20 DIAGNOSIS — I10 HYPERTENSION, UNSPECIFIED TYPE: ICD-10-CM

## 2024-02-20 PROCEDURE — 3075F SYST BP GE 130 - 139MM HG: CPT | Performed by: INTERNAL MEDICINE

## 2024-02-20 PROCEDURE — 99214 OFFICE O/P EST MOD 30 MIN: CPT | Performed by: INTERNAL MEDICINE

## 2024-02-20 PROCEDURE — 1159F MED LIST DOCD IN RCRD: CPT | Performed by: INTERNAL MEDICINE

## 2024-02-20 PROCEDURE — 3078F DIAST BP <80 MM HG: CPT | Performed by: INTERNAL MEDICINE

## 2024-02-20 PROCEDURE — 1036F TOBACCO NON-USER: CPT | Performed by: INTERNAL MEDICINE

## 2024-02-20 PROCEDURE — 1126F AMNT PAIN NOTED NONE PRSNT: CPT | Performed by: INTERNAL MEDICINE

## 2024-02-20 RX ORDER — SERTRALINE HYDROCHLORIDE 100 MG/1
100 TABLET, FILM COATED ORAL DAILY
Qty: 90 TABLET | Refills: 1 | Status: SHIPPED | OUTPATIENT
Start: 2024-02-20

## 2024-02-20 RX ORDER — LOSARTAN POTASSIUM 100 MG/1
100 TABLET ORAL DAILY
Qty: 90 TABLET | Refills: 1 | Status: SHIPPED | OUTPATIENT
Start: 2024-02-20 | End: 2024-08-18

## 2024-02-20 RX ORDER — ROSUVASTATIN CALCIUM 5 MG/1
5 TABLET, COATED ORAL DAILY
Qty: 90 TABLET | Refills: 1 | Status: SHIPPED | OUTPATIENT
Start: 2024-02-20 | End: 2024-08-18

## 2024-02-20 RX ORDER — PANTOPRAZOLE SODIUM 40 MG/1
40 TABLET, DELAYED RELEASE ORAL 2 TIMES DAILY
Qty: 180 TABLET | Refills: 1 | Status: SHIPPED | OUTPATIENT
Start: 2024-02-20 | End: 2024-08-18

## 2024-02-20 RX ORDER — AMLODIPINE BESYLATE 10 MG/1
10 TABLET ORAL DAILY
Qty: 90 TABLET | Refills: 1 | Status: SHIPPED | OUTPATIENT
Start: 2024-02-20 | End: 2024-08-18

## 2024-02-20 ASSESSMENT — ENCOUNTER SYMPTOMS
OCCASIONAL FEELINGS OF UNSTEADINESS: 0
LOSS OF SENSATION IN FEET: 0
DEPRESSION: 0

## 2024-02-20 NOTE — PROGRESS NOTES
Subjective   Patient ID: Slime Alonzo is a 83 y.o. female who presents for Med Refill.    Med Refill       Patient is here for follow-up  Follow-up on hypertension high cholesterol COPD  She is not using oxygen and wondering if she needs to use it   She does feel very tired fatigue   Doing better with anxiety     patient was hospitalized for pneumonia and COPD exacerbation  She is discharged on oxygen on exertion  Follow-up on hypertension and high cholesterol  Patient is asking for handicap sticker as she gets short of breath on exertion    Patient here for follow-up on blood work  Follow-up on hypertension high cholesterol COPD  She is having some breathing issues but not using the inhaler  past recap      Patient is here for follow-up on hypertension  Did blood work  Patient is very unhappy because of pandemic she is feeling very tired next and she has no motivation to move around  She has incontinence of bladder and overactive bladder but cannot afford the medication  She has COPD but does not do any breathing exercises And she does not use inhaler because they're expensive  She gets out of breath on exertion  Wants prescription for Xanax but does not want to sign for contract narcotic contract     Patient here for follow-up on hypertension  Wants refills on Xanax uses it very occasionally  Follow-up on blood work  Complaining of rash on the face on the eyelids  Could not afford oxybutynin so not taking it still having bladder issues  Patient still very upset about having the spasms on the right side of the face        Patient is here for follow-up did blood work. Questions about vitamin D. Blood pressure is running high recently. Runny nose. Leaky bladder she had sling surgery in the past but does not want to consider the surgery of the  Having tingling in the fingers she suspects carpal tunnel but she is refusing EMG  Getting some red blotches on the face  Left arm still hurts no trauma but she gets  "painful when tries to raise it above shoulder  She had multiple colonoscopy to remove fundic all last one showed small for polyps  Son had CABG     pasr recap  Patient had seen the GI underwent colonoscopy found to have multiple large polyps she was referred to other gastroenterologist to remove 1 polyp but could not do the other so she was referred again to another specialist surgeon who referred her back to her different gastroenterologist she was able to remove the bigger polyp.  Pathology showed precancerous polyps  Patient has a follow-up in 6 months with the gastroenterologist  She is here for follow-up on blood work  Follow-up on hypertension COPD hyperlipidemia  She is also due for mammogram wants prescription for Xanax        PAST RECALL  Patient is here with concerns about having blood in the stool  She noticed a few blood clots small in the morning then she is noticing blood on the toilet paper she gets little constipated denies any abdominal pain  She had colonoscopy about 8 years ago which was normal  Here for follow-up on hypertension and COPD high cholesterol  Did blood work needs medication refill      Review of Systems    Objective   /66   Ht 1.549 m (5' 1\")   Wt 68.4 kg (150 lb 12.8 oz)   BMI 28.49 kg/m²     Physical Exam  Vitals reviewed.   Constitutional:       Appearance: Normal appearance.   HENT:      Head: Normocephalic and atraumatic.      Right Ear: Tympanic membrane, ear canal and external ear normal.      Left Ear: Tympanic membrane, ear canal and external ear normal.      Nose: Nose normal.      Mouth/Throat:      Pharynx: Oropharynx is clear.   Eyes:      Extraocular Movements: Extraocular movements intact.      Conjunctiva/sclera: Conjunctivae normal.      Pupils: Pupils are equal, round, and reactive to light.   Cardiovascular:      Rate and Rhythm: Normal rate and regular rhythm.      Pulses: Normal pulses.      Heart sounds: Normal heart sounds.   Pulmonary:      Effort: " Pulmonary effort is normal.      Breath sounds: Normal breath sounds.   Abdominal:      General: Abdomen is flat. Bowel sounds are normal.      Palpations: Abdomen is soft.   Musculoskeletal:      Cervical back: Normal range of motion and neck supple.   Skin:     General: Skin is warm and dry.   Neurological:      General: No focal deficit present.      Mental Status: She is alert and oriented to person, place, and time.   Psychiatric:         Mood and Affect: Mood normal.         Assessment/Plan   Problem List Items Addressed This Visit          Cardiac and Vasculature    Hyperlipidemia    Relevant Medications    rosuvastatin (Crestor) 5 mg tablet    Other Relevant Orders    CBC    Comprehensive Metabolic Panel    Lipid Panel    Thyroid Stimulating Hormone       Gastrointestinal and Abdominal    GERD (gastroesophageal reflux disease)    Relevant Medications    pantoprazole (ProtoNix) 40 mg EC tablet    Other Relevant Orders    CBC    Comprehensive Metabolic Panel    Lipid Panel    Thyroid Stimulating Hormone       Mental Health    Anxiety    Relevant Medications    sertraline (Zoloft) 100 mg tablet    Other Relevant Orders    CBC    Comprehensive Metabolic Panel    Lipid Panel    Thyroid Stimulating Hormone     Other Visit Diagnoses       Hypertension, unspecified type        Relevant Medications    amLODIPine (Norvasc) 10 mg tablet    losartan (Cozaar) 100 mg tablet    Other Relevant Orders    CBC    Comprehensive Metabolic Panel    Lipid Panel    Thyroid Stimulating Hormone        4/27  Narcotic contract and  Xanax occasional use  Patient used 30 tablets last year but wants more  30 tablets with 1 refill given  [facility] report done  Stat CAT scan ordered for abdomen and pelvis  Repeat CBC ordered because patient thinks that her anemia is probably a lab error  Patient has history of 2 big polyps in the colon  She may need to get the GI doctor again  Medications refilled  Encourage patient to do blood work in 3  months  She should also get evaluated for cardiac etiology because there is lots of atherosclerotic changes in her artery  Follow-up after CAT scan     5/3  CAT scan results reviewed  Patient has large hiatal hernia  Anemia most likely related to hiatal hernia  Continue Protonix 40 mg twice a day  Urgent appointment made with GI for tomorrow  Patient needs EGD  Patient is very anxious all her questions answered     3/25  Stat x-ray of the knee done  Shows mild joint effusion  Possibly patient had meniscal tear  Refer to orthopedic will benefit from steroid injection  Recent blood work reviewed  Blood pressure stable  Cholesterol well controlled  Prescription for Xanax given for emergency use  [facility] report     10/6/22  Flu shot given  Blood work reviewed  This drop in H&H again  Patient does not want to pursue further  Start taking iron supplements  Advised patient to repeat the blood work in couple of weeks but she is not willing to  Left message with [place] [phone]  Added Spiriva  Flu shot given  Blood pressure is also very high  Patient says she gets nervous and anxious otherwise her blood pressure is good  Does not want to make changes  Follow-up in 3 months     Total time spent in visit more than 50 minutes more than 50% time in face-to-face counseling     2/17/23  Clinically patient is doing better  Pneumonia is improving  Handicap sticker given  Blood pressure is doing better  Blood work ordered CBC CMP fasting lipid  Follow-up in a month  Discussed lifestyle modification    7/19/2023  Clinically patient looks better  Blood work looks improved  Blood pressure is stable  She is upset about needing Botox shot  Her anxiety is and questions answered  Follow-up blood work in 6 months  Triglycerides little high discussed diet and exercise  Medications refilled    2/20/2024  Blood work reviewed  Blood sugar slightly high  Cut down carbs in the diet  Triglycerides little high  Overall patient is looking good doing  good  Encouraged to use oxygen patient refuses  She is little disappointed that she is followed Dr. Romel Rebollar and she thought he will do the surgery but he left up to her to make decision  And she is not able to make decision and feels more frustrated  Explained to her she can think about it and make decision  Follow-up blood work in 6

## 2024-04-08 ENCOUNTER — PROCEDURE VISIT (OUTPATIENT)
Dept: NEUROLOGY | Facility: CLINIC | Age: 84
End: 2024-04-08
Payer: MEDICARE

## 2024-04-08 VITALS — BODY MASS INDEX: 28.15 KG/M2 | WEIGHT: 149 LBS

## 2024-04-08 DIAGNOSIS — G51.31 CLONIC HEMIFACIAL SPASM, RIGHT: Primary | ICD-10-CM

## 2024-04-08 PROCEDURE — 64612 DESTROY NERVE FACE MUSCLE: CPT | Performed by: PSYCHIATRY & NEUROLOGY

## 2024-04-08 NOTE — PROGRESS NOTES
Subjective   Slime Alonzo is an 83 y.o. female here for medical botulinum injection for hemifacial spasm.    [facility]     Patient reports that the last injections worked better after pretarsal injections added. Wore off 4 days ago. Has eye closure when she eats and right facial pulling. Previously had facial weakness from higher doses but no ptosis or upper face side effects. She is generally bothered by the facial weakness, and prefers to continue to skip the zygomaticus injection.  She also wants to eliminate the mentalis injection because she does not think she likely needs it.  We had a long discussion.    Objective   Neurological Exam    Exam: R eye clonic spasms and R facial pulling   Physical Exam  Procedures    Prior to the start of the procedure a time out was taken and the following were verified: the identity of the patient using two patient identifiers (name, ) - this was verified by the patient    The patient was prepped in the usual sterile fashion. OnabotulinumtoxinA (Botox) was injected as follows:    Total dose    Muscle    6 units right superomedial orbicularis oculi   6 units  right superolateral orbicularis oculi   10 units  right lateral orbicularis oculi   10 units  right inferolateral orbicularis oculi   10 units  right inferior orbicularis oculi   2 units right pretarsal (far medial on eyelid 1 unit and far lateral 1 unit)  0 unit right zygomaticus   0 unit right mentalis (-1 unit)    Total amount of botulinum toxin used was 44 units.  Total amount discarded was 56 units.  Total billed was 100 units.    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 She will return in approximately 3 months for repeat injections.

## 2024-07-08 ENCOUNTER — APPOINTMENT (OUTPATIENT)
Dept: NEUROLOGY | Facility: CLINIC | Age: 84
End: 2024-07-08
Payer: MEDICARE

## 2024-07-08 VITALS — WEIGHT: 146 LBS | BODY MASS INDEX: 27.59 KG/M2

## 2024-07-08 DIAGNOSIS — G51.31 CLONIC HEMIFACIAL SPASM, RIGHT: Primary | ICD-10-CM

## 2024-07-08 PROCEDURE — 64612 DESTROY NERVE FACE MUSCLE: CPT | Performed by: PSYCHIATRY & NEUROLOGY

## 2024-07-08 NOTE — PROGRESS NOTES
Subjective   lSime Alonzo is an 83 y.o. female here for medical botulinum injection for hemifacial spasm.    [facility] / [facility]     Patient reports that the last injections worked better after pretarsal injections added. Wore off 1 week ago. Has eye closure when she eats and right facial pulling. Previously had facial weakness from higher doses but no ptosis or upper face side effects. She is generally bothered by the facial weakness, and prefers to continue to skip the zygomaticus injection.  She also wants to eliminate the mentalis injection because she does not think she likely needs it.      Objective   Neurological Exam    Exam: R eye clonic spasms and R facial pulling   Physical Exam  Procedures    Prior to the start of the procedure a time out was taken and the following were verified: the identity of the patient using two patient identifiers (name, ) - this was verified by the patient    The patient was prepped in the usual sterile fashion. OnabotulinumtoxinA (Botox) was injected as follows:    Total dose    Muscle    6 units right superomedial orbicularis oculi   6 units  right superolateral orbicularis oculi   10 units  right lateral orbicularis oculi   10 units  right inferolateral orbicularis oculi   10 units  right inferior orbicularis oculi   2 units right pretarsal (far medial on eyelid 1 unit and far lateral 1 unit)  0 unit right zygomaticus   0 unit right mentalis (-1 unit)    Total amount of botulinum toxin used was 44 units.  Total amount discarded was 56 units.  Total billed was 100 units.    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 She will return in approximately 3 months for repeat injections.

## 2024-08-19 DIAGNOSIS — R35.0 URINE FREQUENCY: ICD-10-CM

## 2024-08-20 ENCOUNTER — LAB (OUTPATIENT)
Dept: LAB | Facility: LAB | Age: 84
End: 2024-08-20
Payer: MEDICARE

## 2024-08-20 DIAGNOSIS — E78.5 HYPERLIPIDEMIA, UNSPECIFIED HYPERLIPIDEMIA TYPE: ICD-10-CM

## 2024-08-20 DIAGNOSIS — R35.0 URINE FREQUENCY: ICD-10-CM

## 2024-08-20 DIAGNOSIS — F41.9 ANXIETY: ICD-10-CM

## 2024-08-20 DIAGNOSIS — K21.00 GASTROESOPHAGEAL REFLUX DISEASE WITH ESOPHAGITIS, UNSPECIFIED WHETHER HEMORRHAGE: ICD-10-CM

## 2024-08-20 DIAGNOSIS — I10 HYPERTENSION, UNSPECIFIED TYPE: ICD-10-CM

## 2024-08-20 LAB
ALBUMIN SERPL BCP-MCNC: 4.4 G/DL (ref 3.4–5)
ALP SERPL-CCNC: 61 U/L (ref 33–136)
ALT SERPL W P-5'-P-CCNC: 12 U/L (ref 7–45)
ANION GAP SERPL CALC-SCNC: 12 MMOL/L (ref 10–20)
APPEARANCE UR: ABNORMAL
AST SERPL W P-5'-P-CCNC: 16 U/L (ref 9–39)
BILIRUB SERPL-MCNC: 0.7 MG/DL (ref 0–1.2)
BILIRUB UR STRIP.AUTO-MCNC: NEGATIVE MG/DL
BUN SERPL-MCNC: 14 MG/DL (ref 6–23)
CALCIUM SERPL-MCNC: 9.6 MG/DL (ref 8.6–10.6)
CHLORIDE SERPL-SCNC: 101 MMOL/L (ref 98–107)
CHOLEST SERPL-MCNC: 162 MG/DL (ref 0–199)
CHOLESTEROL/HDL RATIO: 2.3
CO2 SERPL-SCNC: 33 MMOL/L (ref 21–32)
COLOR UR: ABNORMAL
CREAT SERPL-MCNC: 0.66 MG/DL (ref 0.5–1.05)
EGFRCR SERPLBLD CKD-EPI 2021: 87 ML/MIN/1.73M*2
ERYTHROCYTE [DISTWIDTH] IN BLOOD BY AUTOMATED COUNT: 13 % (ref 11.5–14.5)
GLUCOSE SERPL-MCNC: 112 MG/DL (ref 74–99)
GLUCOSE UR STRIP.AUTO-MCNC: NORMAL MG/DL
HCT VFR BLD AUTO: 48.3 % (ref 36–46)
HDLC SERPL-MCNC: 71.1 MG/DL
HGB BLD-MCNC: 15.4 G/DL (ref 12–16)
KETONES UR STRIP.AUTO-MCNC: NEGATIVE MG/DL
LDLC SERPL CALC-MCNC: 64 MG/DL
LEUKOCYTE ESTERASE UR QL STRIP.AUTO: ABNORMAL
MCH RBC QN AUTO: 27.7 PG (ref 26–34)
MCHC RBC AUTO-ENTMCNC: 31.9 G/DL (ref 32–36)
MCV RBC AUTO: 87 FL (ref 80–100)
MUCOUS THREADS #/AREA URNS AUTO: ABNORMAL /LPF
NITRITE UR QL STRIP.AUTO: NEGATIVE
NON HDL CHOLESTEROL: 91 MG/DL (ref 0–149)
NRBC BLD-RTO: 0 /100 WBCS (ref 0–0)
PH UR STRIP.AUTO: 5.5 [PH]
PLATELET # BLD AUTO: 187 X10*3/UL (ref 150–450)
POTASSIUM SERPL-SCNC: 4.6 MMOL/L (ref 3.5–5.3)
PROT SERPL-MCNC: 6.8 G/DL (ref 6.4–8.2)
PROT UR STRIP.AUTO-MCNC: ABNORMAL MG/DL
RBC # BLD AUTO: 5.55 X10*6/UL (ref 4–5.2)
RBC # UR STRIP.AUTO: ABNORMAL /UL
RBC #/AREA URNS AUTO: >20 /HPF
SODIUM SERPL-SCNC: 141 MMOL/L (ref 136–145)
SP GR UR STRIP.AUTO: 1.02
SQUAMOUS #/AREA URNS AUTO: ABNORMAL /HPF
TRIGL SERPL-MCNC: 135 MG/DL (ref 0–149)
TSH SERPL-ACNC: 1.62 MIU/L (ref 0.44–3.98)
UROBILINOGEN UR STRIP.AUTO-MCNC: NORMAL MG/DL
VLDL: 27 MG/DL (ref 0–40)
WBC # BLD AUTO: 7.7 X10*3/UL (ref 4.4–11.3)
WBC #/AREA URNS AUTO: >50 /HPF

## 2024-08-20 PROCEDURE — 84443 ASSAY THYROID STIM HORMONE: CPT

## 2024-08-20 PROCEDURE — 80053 COMPREHEN METABOLIC PANEL: CPT

## 2024-08-20 PROCEDURE — 80061 LIPID PANEL: CPT

## 2024-08-20 PROCEDURE — 87186 SC STD MICRODIL/AGAR DIL: CPT

## 2024-08-20 PROCEDURE — 85027 COMPLETE CBC AUTOMATED: CPT

## 2024-08-20 PROCEDURE — 36415 COLL VENOUS BLD VENIPUNCTURE: CPT

## 2024-08-20 PROCEDURE — 87086 URINE CULTURE/COLONY COUNT: CPT

## 2024-08-20 PROCEDURE — 81001 URINALYSIS AUTO W/SCOPE: CPT

## 2024-08-22 LAB — BACTERIA UR CULT: ABNORMAL

## 2024-08-27 ENCOUNTER — APPOINTMENT (OUTPATIENT)
Dept: PRIMARY CARE | Facility: CLINIC | Age: 84
End: 2024-08-27
Payer: MEDICARE

## 2024-08-27 VITALS
BODY MASS INDEX: 27.94 KG/M2 | WEIGHT: 148 LBS | HEIGHT: 61 IN | DIASTOLIC BLOOD PRESSURE: 72 MMHG | SYSTOLIC BLOOD PRESSURE: 134 MMHG

## 2024-08-27 DIAGNOSIS — E78.5 HYPERLIPIDEMIA, UNSPECIFIED HYPERLIPIDEMIA TYPE: ICD-10-CM

## 2024-08-27 DIAGNOSIS — K44.9 HIATAL HERNIA: Primary | ICD-10-CM

## 2024-08-27 DIAGNOSIS — I10 HYPERTENSION, UNSPECIFIED TYPE: ICD-10-CM

## 2024-08-27 DIAGNOSIS — K21.00 GASTROESOPHAGEAL REFLUX DISEASE WITH ESOPHAGITIS, UNSPECIFIED WHETHER HEMORRHAGE: ICD-10-CM

## 2024-08-27 DIAGNOSIS — F41.9 ANXIETY: ICD-10-CM

## 2024-08-27 PROCEDURE — 1036F TOBACCO NON-USER: CPT | Performed by: INTERNAL MEDICINE

## 2024-08-27 PROCEDURE — 99214 OFFICE O/P EST MOD 30 MIN: CPT | Performed by: INTERNAL MEDICINE

## 2024-08-27 PROCEDURE — 3075F SYST BP GE 130 - 139MM HG: CPT | Performed by: INTERNAL MEDICINE

## 2024-08-27 PROCEDURE — 3078F DIAST BP <80 MM HG: CPT | Performed by: INTERNAL MEDICINE

## 2024-08-27 RX ORDER — LOSARTAN POTASSIUM 100 MG/1
100 TABLET ORAL DAILY
Qty: 90 TABLET | Refills: 1 | Status: SHIPPED | OUTPATIENT
Start: 2024-08-27 | End: 2025-02-23

## 2024-08-27 RX ORDER — PANTOPRAZOLE SODIUM 40 MG/1
40 TABLET, DELAYED RELEASE ORAL 2 TIMES DAILY
Qty: 180 TABLET | Refills: 1 | Status: SHIPPED | OUTPATIENT
Start: 2024-08-27 | End: 2025-02-23

## 2024-08-27 RX ORDER — ROSUVASTATIN CALCIUM 5 MG/1
5 TABLET, COATED ORAL DAILY
Qty: 90 TABLET | Refills: 1 | Status: SHIPPED | OUTPATIENT
Start: 2024-08-27 | End: 2025-02-23

## 2024-08-27 RX ORDER — AMLODIPINE BESYLATE 10 MG/1
10 TABLET ORAL DAILY
Qty: 90 TABLET | Refills: 1 | Status: SHIPPED | OUTPATIENT
Start: 2024-08-27 | End: 2025-02-23

## 2024-08-27 RX ORDER — SERTRALINE HYDROCHLORIDE 100 MG/1
100 TABLET, FILM COATED ORAL DAILY
Qty: 90 TABLET | Refills: 1 | Status: SHIPPED | OUTPATIENT
Start: 2024-08-27

## 2024-08-27 RX ORDER — CEPHALEXIN 500 MG/1
500 CAPSULE ORAL 3 TIMES DAILY
Qty: 30 CAPSULE | Refills: 0 | Status: SHIPPED | OUTPATIENT
Start: 2024-08-27 | End: 2024-09-06

## 2024-08-27 RX ORDER — ALPRAZOLAM 0.25 MG/1
0.25 TABLET ORAL 3 TIMES DAILY PRN
Qty: 30 TABLET | Refills: 0 | Status: SHIPPED | OUTPATIENT
Start: 2024-08-27 | End: 2025-04-24

## 2024-08-27 ASSESSMENT — ENCOUNTER SYMPTOMS
OCCASIONAL FEELINGS OF UNSTEADINESS: 0
DEPRESSION: 0
LOSS OF SENSATION IN FEET: 0

## 2024-08-27 NOTE — PROGRESS NOTES
Subjective   Patient ID: Slime Alonzo is a 84 y.o. female who presents for Follow-up.    Med Refill       Patient is here for follow-up  Follow-up on  hypertension high cholesterol anxiety  She wants prescription for an Xanax for occasional use  She is also having urinary frequency and burning  She gets short of breath but she is not a candidate for hiatal hernia surgery    Past recap  Patient is here for follow-up  Follow-up on hypertension high cholesterol COPD  She is not using oxygen and wondering if she needs to use it   She does feel very tired fatigue   Doing better with anxiety     patient was hospitalized for pneumonia and COPD exacerbation  She is discharged on oxygen on exertion  Follow-up on hypertension and high cholesterol  Patient is asking for handicap sticker as she gets short of breath on exertion    Patient here for follow-up on blood work  Follow-up on hypertension high cholesterol COPD  She is having some breathing issues but not using the inhaler  past recap      Patient is here for follow-up on hypertension  Did blood work  Patient is very unhappy because of pandemic she is feeling very tired next and she has no motivation to move around  She has incontinence of bladder and overactive bladder but cannot afford the medication  She has COPD but does not do any breathing exercises And she does not use inhaler because they're expensive  She gets out of breath on exertion  Wants prescription for Xanax but does not want to sign for contract narcotic contract     Patient here for follow-up on hypertension  Wants refills on Xanax uses it very occasionally  Follow-up on blood work  Complaining of rash on the face on the eyelids  Could not afford oxybutynin so not taking it still having bladder issues  Patient still very upset about having the spasms on the right side of the face        Patient is here for follow-up did blood work. Questions about vitamin D. Blood pressure is running high recently.  "Runny nose. Leaky bladder she had sling surgery in the past but does not want to consider the surgery of the  Having tingling in the fingers she suspects carpal tunnel but she is refusing EMG  Getting some red blotches on the face  Left arm still hurts no trauma but she gets painful when tries to raise it above shoulder  She had multiple colonoscopy to remove fundic all last one showed small for polyps  Son had CABG     pasr recap  Patient had seen the GI underwent colonoscopy found to have multiple large polyps she was referred to other gastroenterologist to remove 1 polyp but could not do the other so she was referred again to another specialist surgeon who referred her back to her different gastroenterologist she was able to remove the bigger polyp.  Pathology showed precancerous polyps  Patient has a follow-up in 6 months with the gastroenterologist  She is here for follow-up on blood work  Follow-up on hypertension COPD hyperlipidemia  She is also due for mammogram wants prescription for Xanax        PAST RECALL  Patient is here with concerns about having blood in the stool  She noticed a few blood clots small in the morning then she is noticing blood on the toilet paper she gets little constipated denies any abdominal pain  She had colonoscopy about 8 years ago which was normal  Here for follow-up on hypertension and COPD high cholesterol  Did blood work needs medication refill      Review of Systems    Objective   /72   Ht 1.549 m (5' 1\")   Wt 67.1 kg (148 lb)   BMI 27.96 kg/m²     Physical Exam  Vitals reviewed.   Constitutional:       Appearance: Normal appearance.   HENT:      Head: Normocephalic and atraumatic.      Right Ear: Tympanic membrane, ear canal and external ear normal.      Left Ear: Tympanic membrane, ear canal and external ear normal.      Nose: Nose normal.      Mouth/Throat:      Pharynx: Oropharynx is clear.   Eyes:      Extraocular Movements: Extraocular movements intact.      " Conjunctiva/sclera: Conjunctivae normal.      Pupils: Pupils are equal, round, and reactive to light.   Cardiovascular:      Rate and Rhythm: Normal rate and regular rhythm.      Pulses: Normal pulses.      Heart sounds: Normal heart sounds.   Pulmonary:      Effort: Pulmonary effort is normal.      Breath sounds: Normal breath sounds.   Abdominal:      General: Abdomen is flat. Bowel sounds are normal.      Palpations: Abdomen is soft.   Musculoskeletal:      Cervical back: Normal range of motion and neck supple.   Skin:     General: Skin is warm and dry.   Neurological:      General: No focal deficit present.      Mental Status: She is alert and oriented to person, place, and time.   Psychiatric:         Mood and Affect: Mood normal.         Assessment/Plan   Problem List Items Addressed This Visit          Cardiac and Vasculature    Hyperlipidemia    Relevant Medications    rosuvastatin (Crestor) 5 mg tablet    Other Relevant Orders    CBC    C-Reactive Protein    Lipid Panel    Thyroid Stimulating Hormone       Gastrointestinal and Abdominal    GERD (gastroesophageal reflux disease)    Relevant Medications    pantoprazole (ProtoNix) 40 mg EC tablet    cephalexin (Keflex) 500 mg capsule       Mental Health    Anxiety    Relevant Medications    sertraline (Zoloft) 100 mg tablet    ALPRAZolam (Xanax) 0.25 mg tablet     Other Visit Diagnoses       Hiatal hernia    -  Primary    Hypertension, unspecified type        Relevant Medications    losartan (Cozaar) 100 mg tablet    amLODIPine (Norvasc) 10 mg tablet    Other Relevant Orders    CBC    C-Reactive Protein    Lipid Panel    Thyroid Stimulating Hormone          4/27  Narcotic contract and  Xanax occasional use  Patient used 30 tablets last year but wants more  30 tablets with 1 refill given  [facility] report done  Stat CAT scan ordered for abdomen and pelvis  Repeat CBC ordered because patient thinks that her anemia is probably a lab error  Patient has history of 2  big polyps in the colon  She may need to get the GI doctor again  Medications refilled  Encourage patient to do blood work in 3 months  She should also get evaluated for cardiac etiology because there is lots of atherosclerotic changes in her artery  Follow-up after CAT scan     5/3  CAT scan results reviewed  Patient has large hiatal hernia  Anemia most likely related to hiatal hernia  Continue Protonix 40 mg twice a day  Urgent appointment made with GI for tomorrow  Patient needs EGD  Patient is very anxious all her questions answered     3/25  Stat x-ray of the knee done  Shows mild joint effusion  Possibly patient had meniscal tear  Refer to orthopedic will benefit from steroid injection  Recent blood work reviewed  Blood pressure stable  Cholesterol well controlled  Prescription for Xanax given for emergency use  [facility] report     10/6/22  Flu shot given  Blood work reviewed  This drop in H&H again  Patient does not want to pursue further  Start taking iron supplements  Advised patient to repeat the blood work in couple of weeks but she is not willing to  Left message with [place] [phone]  Added Spiriva  Flu shot given  Blood pressure is also very high  Patient says she gets nervous and anxious otherwise her blood pressure is good  Does not want to make changes  Follow-up in 3 months     Total time spent in visit more than 50 minutes more than 50% time in face-to-face counseling     2/17/23  Clinically patient is doing better  Pneumonia is improving  Handicap sticker given  Blood pressure is doing better  Blood work ordered CBC CMP fasting lipid  Follow-up in a month  Discussed lifestyle modification    7/19/2023  Clinically patient looks better  Blood work looks improved  Blood pressure is stable  She is upset about needing Botox shot  Her anxiety is and questions answered  Follow-up blood work in 6 months  Triglycerides little high discussed diet and exercise  Medications refilled    2/20/2024  Blood work  reviewed  Blood sugar slightly high  Cut down carbs in the diet  Triglycerides little high  Overall patient is looking good doing good  Encouraged to use oxygen patient refuses  She is little disappointed that she is followed Dr. Romel Rebollar and she thought he will do the surgery but he left up to her to make decision  And she is not able to make decision and feels more frustrated  Explained to her she can think about it and make decision  Follow-up blood work in 6    8/27/2024  Blood work reviewed  UTI treat with Keflex  Xanax 30 tablets given  Cholesterol doing well  Blood pressure is doing well  Patient has hiatal hernia and esophageal dysfunction.  Advised to ambulate more and eat small meals to avoid symptoms of heaviness  She is doing excellent for her age  Follow-up in 6 months

## 2024-09-17 DIAGNOSIS — Z12.31 SCREENING MAMMOGRAM, ENCOUNTER FOR: ICD-10-CM

## 2024-09-24 ENCOUNTER — HOSPITAL ENCOUNTER (OUTPATIENT)
Dept: RADIOLOGY | Facility: CLINIC | Age: 84
Discharge: HOME | End: 2024-09-24
Payer: MEDICARE

## 2024-09-24 VITALS — HEIGHT: 61 IN | WEIGHT: 150 LBS | BODY MASS INDEX: 28.32 KG/M2

## 2024-09-24 DIAGNOSIS — Z12.31 SCREENING MAMMOGRAM, ENCOUNTER FOR: ICD-10-CM

## 2024-09-24 PROCEDURE — 77067 SCR MAMMO BI INCL CAD: CPT | Performed by: RADIOLOGY

## 2024-09-24 PROCEDURE — 77063 BREAST TOMOSYNTHESIS BI: CPT | Performed by: RADIOLOGY

## 2024-09-24 PROCEDURE — 77067 SCR MAMMO BI INCL CAD: CPT

## 2024-10-14 ENCOUNTER — APPOINTMENT (OUTPATIENT)
Dept: NEUROLOGY | Facility: CLINIC | Age: 84
End: 2024-10-14
Payer: MEDICARE

## 2024-10-14 VITALS — BODY MASS INDEX: 27.96 KG/M2 | WEIGHT: 148 LBS

## 2024-10-14 DIAGNOSIS — G51.31 CLONIC HEMIFACIAL SPASM, RIGHT: Primary | ICD-10-CM

## 2024-10-14 PROCEDURE — 64612 DESTROY NERVE FACE MUSCLE: CPT | Performed by: PSYCHIATRY & NEUROLOGY

## 2024-10-14 NOTE — PROGRESS NOTES
Subjective   Slime Alonzo is an 84 y.o. female here for medical botulinum injection for hemifacial spasm.    [facility] / [facility]     Patient reports that the last injections worked better after pretarsal injections added. Wore off 2 week early (3 weeks ago). Has eye closure when she eats and right facial pulling. Previously had facial weakness from higher doses but no ptosis or upper face side effects. She is generally bothered by the facial weakness, and prefers to continue to skip the zygomaticus injection.  She also wants to eliminate the mentalis injection because she does not think she likely needs it.      Objective   Neurological Exam    Exam: R eye clonic spasms and R facial pulling   Physical Exam  Procedures    Prior to the start of the procedure a time out was taken and the following were verified: the identity of the patient using two patient identifiers (name, ) - this was verified by the patient    The patient was prepped in the usual sterile fashion. OnabotulinumtoxinA (Botox) was injected as follows:    Total dose    Muscle    6 units right superomedial orbicularis oculi   6 units  right superolateral orbicularis oculi   10 units  right lateral orbicularis oculi   10 units  right inferolateral orbicularis oculi   10 units  right inferior orbicularis oculi   2 units right pretarsal (far medial on eyelid 1 unit and far lateral 1 unit)  0 unit right zygomaticus   0 unit right mentalis     Total amount of botulinum toxin used was 44 units.  Total amount discarded was 56 units.  Total billed was 100 units.    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 She will return in approximately 3 months for repeat injections.

## 2025-01-16 NOTE — PROGRESS NOTES
HPI:   Anxiety  This is a chronic problem. The current episode started more than 1 year ago (She has had anxiety for over 1 year). The problem occurs constantly. The problem has been gradually worsening. Associated symptoms include diaphoresis, nausea and vertigo. Pertinent negatives include no chest pain, chills, coughing, fever, vomiting or weakness. Exacerbated by: \"Everything. Showering is a chore.\" She has tried relaxation and rest (Sees psych, is taking Sertraline, and sees a therapist once a month) for the symptoms. The treatment provided no relief.      Patient presents today for multiple concerns:  1.) Fatigue- Patient had an iron infusion in June. Felt extreme fatigue a month later. \"Showering is a chore for me.\"   2.) Anxiety and irritability \"over small things\"  3.) Bruising- Cannot attribute them to any specific event  4.) Nails are brittle  5.) Feeling \"faint\" for the past 3 days. Denies syncopal episodes. She is eating and drinking adequately. Believes she is experiencing weight gain.    Sees Lori Krishnamurthy at [facility] every 3 months. Patient sees a therapist once a month. States it is going well. 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 She states, \"this is nothing like that. I felt numb.\"    Current Outpatient Medications   Medication Sig Dispense Refill    valACYclovir 500 MG Oral Tab Take 1 tablet (500 mg total) by mouth 2 (two) times daily.      sertraline 50 MG Oral Tab       methIMAzole 5 MG Oral Tab Take 1 tablet (5 mg total) by mouth daily. 30 tablet 0      Past Medical History:    Acute blood loss anemia    Acute sinusitis, unspecified    Acute tonsillitis    Acute tonsillitis    Anemia    Anxiety    Bloating    Body piercing    Chronic tonsillitis    Constipation    Decorative tattoo    Diarrhea, unspecified    Easy bruising    Fatigue    Flatulence/gas pain/belching    Food intolerance    Frequent UTI    Heartburn    Heavy menses    High cholesterol    History of cold sores     See note  Physical Exam     History of mental disorder    Hyperemesis    Hypertriglyceridemia    Indigestion    Irregular bowel habits    Itch of skin    Menses painful    Nausea    Otalgia, unspecified    Other allergy, other than to medicinal agents    Pruritus, unspecified    Scoliosis (and kyphoscoliosis), idiopathic    Seasonal allergies    Sinus tachycardia    Skin blushing/flushing    Stool incontinence    Stress    Vomiting    Wears glasses    Weight gain      Past Surgical History:   Procedure Laterality Date    Colposcopy, cervix w/upper adjacent vagina; w/biopsy(s), cervix Bilateral 01/01/2020    Tonsillectomy        Family History   Problem Relation Age of Onset    Arthritis Paternal Grandmother     Cancer Paternal Grandmother         lung, breast, and brain cancer    Breast Cancer Paternal Grandmother     Mental Disorder Paternal Grandmother     Diabetes Maternal Grandmother     Heart Disease Paternal Grandfather     Stroke Paternal Grandfather     Other (mental disability) Mother         suicide    Mental Disorder Mother     Other (mental disability) Maternal Grandfather     Heart Disorder Father     Diabetes Father     Heart Attack Father     Ovarian Cancer Maternal Aunt     Mental Disorder Sister       Social History     Socioeconomic History    Marital status:    Occupational History    Occupation:    Tobacco Use    Smoking status: Never    Smokeless tobacco: Never   Vaping Use    Vaping status: Never Used   Substance and Sexual Activity    Alcohol use: Not Currently     Comment: Very rarely    Drug use: Never    Sexual activity: Yes     Partners: Male     Birth control/protection: Condom   Other Topics Concern     Service No    Blood Transfusions No    Caffeine Concern Yes     Comment: 1 cup coffee daily    Sleep Concern No    Stress Concern No    Weight Concern No    Special Diet No    Exercise Yes     Comment: 3 x a week     Seat Belt Yes    Self-Exams Yes     Social Drivers of Health      Financial Resource Strain: Low Risk  (3/23/2024)    Financial Resource Strain     Difficulty of Paying Living Expenses: Not hard at all     Med Affordability: No   Food Insecurity: No Food Insecurity (3/23/2024)    Food Insecurity     Food Insecurity: Never true   Transportation Needs: No Transportation Needs (3/23/2024)    Transportation Needs     Lack of Transportation: No   Stress: No Stress Concern Present (3/23/2024)    Stress     Feeling of Stress : No   Housing Stability: Low Risk  (3/23/2024)    Housing Stability     Housing Instability: No         REVIEW OF SYSTEMS:   Review of Systems   Constitutional:  Positive for diaphoresis and unexpected weight change. Negative for chills and fever.        Feels like she keeps gaining weight. Notices a decrease in appetite.    HENT: Negative.     Eyes: Negative.    Respiratory:  Positive for chest tightness. Negative for cough and shortness of breath.    Cardiovascular: Negative.  Negative for chest pain and palpitations.   Gastrointestinal:  Positive for nausea. Negative for constipation, diarrhea and vomiting.   Genitourinary: Negative.    Musculoskeletal: Negative.    Skin: Negative.    Neurological:  Positive for dizziness, vertigo and light-headedness. Negative for weakness.   Psychiatric/Behavioral:  Negative for suicidal ideas. The patient is nervous/anxious.        EXAM:   /60 (BP Location: Left arm, Patient Position: Sitting, Cuff Size: adult)   Pulse 86   Temp 97.7 °F (36.5 °C) (Temporal)   Ht 5' 2\" (1.575 m)   Wt 126 lb (57.2 kg)   LMP 01/03/2025 (Exact Date)   SpO2 99%   BMI 23.05 kg/m²   Physical Exam  Constitutional:       General: She is not in acute distress.     Appearance: Normal appearance.   Cardiovascular:      Rate and Rhythm: Normal rate and regular rhythm.      Heart sounds: Normal heart sounds.   Pulmonary:      Effort: Pulmonary effort is normal.      Breath sounds: Normal breath sounds.   Neurological:      Mental Status:  She is alert.   Psychiatric:         Attention and Perception: Attention normal.         Mood and Affect: Mood is anxious (mildly).         Behavior: Behavior is cooperative.         ASSESSMENT AND PLAN:   Diagnoses and all orders for this visit:    Other fatigue  -     TSH and Free T4 [E]; Future  -     Ferritin [E]; Future  -     CBC W Differential W Platelet [E]; Future    Low TSH level  -     TSH and Free T4 [E]; Future  -     methIMAzole 5 MG Oral Tab; Take 1 tablet (5 mg total) by mouth daily.    Anxiety    Feeling faint  -     EKG with interpretation and Report -IN OFFICE [77577]    Dizziness    Easy bruising    EKG normal in office today  Repeat TSH, Free T4, Ferritin and CBC. Pending lab results, will start patient on 5mg Methimazole daily.  Patient will call or message with update on Monday. Could also consider increasing Sertraline dose.

## 2025-01-20 ENCOUNTER — APPOINTMENT (OUTPATIENT)
Dept: NEUROLOGY | Facility: CLINIC | Age: 85
End: 2025-01-20
Payer: MEDICARE

## 2025-01-27 ENCOUNTER — APPOINTMENT (OUTPATIENT)
Dept: NEUROLOGY | Facility: CLINIC | Age: 85
End: 2025-01-27
Payer: MEDICARE

## 2025-01-27 VITALS — WEIGHT: 146 LBS | BODY MASS INDEX: 27.59 KG/M2

## 2025-01-27 DIAGNOSIS — G51.31 CLONIC HEMIFACIAL SPASM, RIGHT: Primary | ICD-10-CM

## 2025-01-27 NOTE — PROGRESS NOTES
Subjective   Slime Alonzo is an 84 y.o. female here for medical botulinum injection for hemifacial spasm.    [facility] / [facility]IN[facility]     Patient reports that the last injections worked better after pretarsal injections added. Wore off 2 week early (4 weeks ago).   She wanted to try raising the dose around the eye.    Has eye closure when she eats and right facial pulling. Previously had facial weakness from higher doses but no ptosis or upper face side effects. She is generally bothered by the facial weakness, and preferred to continue to skip the zygomaticus injection.  She also wanted to eliminate the mentalis injection because she did not think she likely needs it.        Objective   Neurological Exam    Exam: R eye clonic spasms and R facial pulling   Physical Exam  Procedures    Prior to the start of the procedure a time out was taken and the following were verified: the identity of the patient using two patient identifiers (name, ) - this was verified by the patient    Written informed consent was re-obtained.    The patient was prepped in the usual sterile fashion. OnabotulinumtoxinA (Botox) was injected as follows:    Total dose    Muscle    8 units right superomedial orbicularis oculi  (+2)  8 units  right superolateral orbicularis oculi (+2)  12 units  right lateral orbicularis oculi (+2)  10 units  right inferolateral orbicularis oculi   10 units  right inferior orbicularis oculi   2 units right pretarsal (far medial on eyelid 1 unit and far lateral 1 unit)  0 unit right zygomaticus   0 unit right mentalis     Total amount of botulinum toxin used was 50 units.  Total amount discarded was 50 units.  Total billed was 100 units.    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 She will return in approximately 3 months for repeat injections. We can consider raising the pretarsal doses next time if necessary.

## 2025-02-15 LAB
CHOLEST SERPL-MCNC: 176 MG/DL
CHOLEST/HDLC SERPL: 2.3 (CALC)
CRP SERPL-MCNC: NORMAL MG/L
ERYTHROCYTE [DISTWIDTH] IN BLOOD BY AUTOMATED COUNT: 12.8 % (ref 11–15)
HCT VFR BLD AUTO: 47.8 % (ref 35–45)
HDLC SERPL-MCNC: 76 MG/DL
HGB BLD-MCNC: 15.9 G/DL (ref 11.7–15.5)
LDLC SERPL CALC-MCNC: 76 MG/DL (CALC)
MCH RBC QN AUTO: 28.8 PG (ref 27–33)
MCHC RBC AUTO-ENTMCNC: 33.3 G/DL (ref 32–36)
MCV RBC AUTO: 86.4 FL (ref 80–100)
NONHDLC SERPL-MCNC: 100 MG/DL (CALC)
PLATELET # BLD AUTO: 182 THOUSAND/UL (ref 140–400)
PMV BLD REES-ECKER: 11.6 FL (ref 7.5–12.5)
RBC # BLD AUTO: 5.53 MILLION/UL (ref 3.8–5.1)
TRIGL SERPL-MCNC: 141 MG/DL
TSH SERPL-ACNC: 2.49 MIU/L (ref 0.4–4.5)
WBC # BLD AUTO: 6.3 THOUSAND/UL (ref 3.8–10.8)

## 2025-02-17 LAB
CHOLEST SERPL-MCNC: 176 MG/DL
CHOLEST/HDLC SERPL: 2.3 (CALC)
CRP SERPL-MCNC: <3 MG/L
ERYTHROCYTE [DISTWIDTH] IN BLOOD BY AUTOMATED COUNT: 12.8 % (ref 11–15)
HCT VFR BLD AUTO: 47.8 % (ref 35–45)
HDLC SERPL-MCNC: 76 MG/DL
HGB BLD-MCNC: 15.9 G/DL (ref 11.7–15.5)
LDLC SERPL CALC-MCNC: 76 MG/DL (CALC)
MCH RBC QN AUTO: 28.8 PG (ref 27–33)
MCHC RBC AUTO-ENTMCNC: 33.3 G/DL (ref 32–36)
MCV RBC AUTO: 86.4 FL (ref 80–100)
NONHDLC SERPL-MCNC: 100 MG/DL (CALC)
PLATELET # BLD AUTO: 182 THOUSAND/UL (ref 140–400)
PMV BLD REES-ECKER: 11.6 FL (ref 7.5–12.5)
RBC # BLD AUTO: 5.53 MILLION/UL (ref 3.8–5.1)
TRIGL SERPL-MCNC: 141 MG/DL
TSH SERPL-ACNC: 2.49 MIU/L (ref 0.4–4.5)
WBC # BLD AUTO: 6.3 THOUSAND/UL (ref 3.8–10.8)

## 2025-02-21 ENCOUNTER — APPOINTMENT (OUTPATIENT)
Dept: PRIMARY CARE | Facility: CLINIC | Age: 85
End: 2025-02-21
Payer: MEDICARE

## 2025-02-21 VITALS
HEIGHT: 60 IN | WEIGHT: 149 LBS | SYSTOLIC BLOOD PRESSURE: 126 MMHG | BODY MASS INDEX: 29.25 KG/M2 | DIASTOLIC BLOOD PRESSURE: 64 MMHG

## 2025-02-21 DIAGNOSIS — F41.9 ANXIETY: ICD-10-CM

## 2025-02-21 DIAGNOSIS — Z00.00 HEALTHCARE MAINTENANCE: ICD-10-CM

## 2025-02-21 DIAGNOSIS — I10 HYPERTENSION, UNSPECIFIED TYPE: ICD-10-CM

## 2025-02-21 DIAGNOSIS — E78.5 HYPERLIPIDEMIA, UNSPECIFIED HYPERLIPIDEMIA TYPE: ICD-10-CM

## 2025-02-21 DIAGNOSIS — Z00.00 MEDICARE ANNUAL WELLNESS VISIT, INITIAL: ICD-10-CM

## 2025-02-21 DIAGNOSIS — M80.00XA AGE-RELATED OSTEOPOROSIS WITH CURRENT PATHOLOGICAL FRACTURE, INITIAL ENCOUNTER: ICD-10-CM

## 2025-02-21 DIAGNOSIS — G47.34 NOCTURNAL HYPOXIA: Primary | ICD-10-CM

## 2025-02-21 PROBLEM — K92.1 HEMATOCHEZIA: Status: ACTIVE | Noted: 2025-02-21

## 2025-02-21 PROBLEM — E66.9 OBESITY WITH BODY MASS INDEX 30 OR GREATER: Status: ACTIVE | Noted: 2025-02-21

## 2025-02-21 ASSESSMENT — ACTIVITIES OF DAILY LIVING (ADL)
DRESSING: INDEPENDENT
BATHING: INDEPENDENT
DOING_HOUSEWORK: INDEPENDENT
GROCERY_SHOPPING: INDEPENDENT
TAKING_MEDICATION: INDEPENDENT
MANAGING_FINANCES: INDEPENDENT

## 2025-02-21 ASSESSMENT — PATIENT HEALTH QUESTIONNAIRE - PHQ9
10. IF YOU CHECKED OFF ANY PROBLEMS, HOW DIFFICULT HAVE THESE PROBLEMS MADE IT FOR YOU TO DO YOUR WORK, TAKE CARE OF THINGS AT HOME, OR GET ALONG WITH OTHER PEOPLE: NOT DIFFICULT AT ALL
2. FEELING DOWN, DEPRESSED OR HOPELESS: SEVERAL DAYS
1. LITTLE INTEREST OR PLEASURE IN DOING THINGS: NOT AT ALL
SUM OF ALL RESPONSES TO PHQ9 QUESTIONS 1 AND 2: 1

## 2025-02-21 ASSESSMENT — ENCOUNTER SYMPTOMS
DEPRESSION: 0
LOSS OF SENSATION IN FEET: 0
OCCASIONAL FEELINGS OF UNSTEADINESS: 0

## 2025-02-21 NOTE — PROGRESS NOTES
Subjective   Patient ID: Slime Alonzo is a 84 y.o. female who presents for Medicare Annual Wellness Visit Subsequent.    Med Refill       Patient is here for Medicare annual wellness exam  Follow-up on hypertension high cholesterol anxiety  Patient did blood work needs medication refill  Wants to know if she can get rid of oxygen  Overall doing better  She is doing better with her anxiety as well    Past recap  Patient is here for follow-up  Follow-up on  hypertension high cholesterol anxiety  She wants prescription for an Xanax for occasional use  She is also having urinary frequency and burning  She gets short of breath but she is not a candidate for hiatal hernia surgery    Patient is here for follow-up  Follow-up on hypertension high cholesterol COPD  She is not using oxygen and wondering if she needs to use it   She does feel very tired fatigue   Doing better with anxiety     patient was hospitalized for pneumonia and COPD exacerbation  She is discharged on oxygen on exertion  Follow-up on hypertension and high cholesterol  Patient is asking for handicap sticker as she gets short of breath on exertion    Patient here for follow-up on blood work  Follow-up on hypertension high cholesterol COPD  She is having some breathing issues but not using the inhaler  past recap      Patient is here for follow-up on hypertension  Did blood work  Patient is very unhappy because of pandemic she is feeling very tired next and she has no motivation to move around  She has incontinence of bladder and overactive bladder but cannot afford the medication  She has COPD but does not do any breathing exercises And she does not use inhaler because they're expensive  She gets out of breath on exertion  Wants prescription for Xanax but does not want to sign for contract narcotic contract     Patient here for follow-up on hypertension  Wants refills on Xanax uses it very occasionally  Follow-up on blood work  Complaining of rash on  "the face on the eyelids  Could not afford oxybutynin so not taking it still having bladder issues  Patient still very upset about having the spasms on the right side of the face        Patient is here for follow-up did blood work. Questions about vitamin D. Blood pressure is running high recently. Runny nose. Leaky bladder she had sling surgery in the past but does not want to consider the surgery of the  Having tingling in the fingers she suspects carpal tunnel but she is refusing EMG  Getting some red blotches on the face  Left arm still hurts no trauma but she gets painful when tries to raise it above shoulder  She had multiple colonoscopy to remove fundic all last one showed small for polyps  Son had CABG     pasr recap  Patient had seen the GI underwent colonoscopy found to have multiple large polyps she was referred to other gastroenterologist to remove 1 polyp but could not do the other so she was referred again to another specialist surgeon who referred her back to her different gastroenterologist she was able to remove the bigger polyp.  Pathology showed precancerous polyps  Patient has a follow-up in 6 months with the gastroenterologist  She is here for follow-up on blood work  Follow-up on hypertension COPD hyperlipidemia  She is also due for mammogram wants prescription for Xanax        PAST RECALL  Patient is here with concerns about having blood in the stool  She noticed a few blood clots small in the morning then she is noticing blood on the toilet paper she gets little constipated denies any abdominal pain  She had colonoscopy about 8 years ago which was normal  Here for follow-up on hypertension and COPD high cholesterol  Did blood work needs medication refill      Review of Systems    Objective   /64   Ht 1.511 m (4' 11.5\")   Wt 67.6 kg (149 lb)   BMI 29.59 kg/m²     Physical Exam  Vitals reviewed.   Constitutional:       Appearance: Normal appearance.   HENT:      Head: Normocephalic and " atraumatic.      Right Ear: Tympanic membrane, ear canal and external ear normal.      Left Ear: Tympanic membrane, ear canal and external ear normal.      Nose: Nose normal.      Mouth/Throat:      Pharynx: Oropharynx is clear.   Eyes:      Extraocular Movements: Extraocular movements intact.      Conjunctiva/sclera: Conjunctivae normal.      Pupils: Pupils are equal, round, and reactive to light.   Cardiovascular:      Rate and Rhythm: Normal rate and regular rhythm.      Pulses: Normal pulses.      Heart sounds: Normal heart sounds.   Pulmonary:      Effort: Pulmonary effort is normal.      Breath sounds: Normal breath sounds.   Abdominal:      General: Abdomen is flat. Bowel sounds are normal.      Palpations: Abdomen is soft.   Musculoskeletal:      Cervical back: Normal range of motion and neck supple.   Skin:     General: Skin is warm and dry.   Neurological:      General: No focal deficit present.      Mental Status: She is alert and oriented to person, place, and time.   Psychiatric:         Mood and Affect: Mood normal.         Assessment/Plan   Problem List Items Addressed This Visit          Cardiac and Vasculature    Hyperlipidemia    Relevant Orders    Comprehensive Metabolic Panel    CBC    Lipid Panel    Vitamin D 25-Hydroxy,Total (for eval of Vitamin D levels)    Thyroid Stimulating Hormone       Mental Health    Anxiety     Other Visit Diagnoses       Nocturnal hypoxia    -  Primary    Hypertension, unspecified type        Relevant Orders    Comprehensive Metabolic Panel    CBC    Lipid Panel    Vitamin D 25-Hydroxy,Total (for eval of Vitamin D levels)    Thyroid Stimulating Hormone    Healthcare maintenance        Relevant Orders    CT cardiac scoring wo IV contrast    Age-related osteoporosis with current pathological fracture, initial encounter        Relevant Orders    Vitamin D 25-Hydroxy,Total (for eval of Vitamin D levels)            4/27  Narcotic contract and  Xanax occasional use  Patient  used 30 tablets last year but wants more  30 tablets with 1 refill given  [facility] report done  Stat CAT scan ordered for abdomen and pelvis  Repeat CBC ordered because patient thinks that her anemia is probably a lab error  Patient has history of 2 big polyps in the colon  She may need to get the GI doctor again  Medications refilled  Encourage patient to do blood work in 3 months  She should also get evaluated for cardiac etiology because there is lots of atherosclerotic changes in her artery  Follow-up after CAT scan     5/3  CAT scan results reviewed  Patient has large hiatal hernia  Anemia most likely related to hiatal hernia  Continue Protonix 40 mg twice a day  Urgent appointment made with GI for tomorrow  Patient needs EGD  Patient is very anxious all her questions answered     3/25  Stat x-ray of the knee done  Shows mild joint effusion  Possibly patient had meniscal tear  Refer to orthopedic will benefit from steroid injection  Recent blood work reviewed  Blood pressure stable  Cholesterol well controlled  Prescription for Xanax given for emergency use  [facility] report     10/6/22  Flu shot given  Blood work reviewed  This drop in H&H again  Patient does not want to pursue further  Start taking iron supplements  Advised patient to repeat the blood work in couple of weeks but she is not willing to  Left message with [place] [phone]  Added Spiriva  Flu shot given  Blood pressure is also very high  Patient says she gets nervous and anxious otherwise her blood pressure is good  Does not want to make changes  Follow-up in 3 months     Total time spent in visit more than 50 minutes more than 50% time in face-to-face counseling     2/17/23  Clinically patient is doing better  Pneumonia is improving  Handicap sticker given  Blood pressure is doing better  Blood work ordered CBC CMP fasting lipid  Follow-up in a month  Discussed lifestyle modification    7/19/2023  Clinically patient looks better  Blood work looks  improved  Blood pressure is stable  She is upset about needing Botox shot  Her anxiety is and questions answered  Follow-up blood work in 6 months  Triglycerides little high discussed diet and exercise  Medications refilled    2/20/2024  Blood work reviewed  Blood sugar slightly high  Cut down carbs in the diet  Triglycerides little high  Overall patient is looking good doing good  Encouraged to use oxygen patient refuses  She is little disappointed that she is followed Dr. Romel Rebollar and she thought he will do the surgery but he left up to her to make decision  And she is not able to make decision and feels more frustrated  Explained to her she can think about it and make decision  Follow-up blood work in 6    8/27/2024  Blood work reviewed  UTI treat with Keflex  Xanax 30 tablets given  Cholesterol doing well  Blood pressure is doing well  Patient has hiatal hernia and esophageal dysfunction.  Advised to ambulate more and eat small meals to avoid symptoms of heaviness  She is doing excellent for her age  Follow-up in 6 months    2/21/2025  Medicare wellness exam done  Mini-Mental status examination done all normal  Patient is really doing very well for her age  Overnight pulse oximetry results to see if she needs oxygen  CT cardiac scoring  Blood pressure stable  Blood work reviewed cholesterol well-controlled  H&H slightly elevated probably from dehydration  Patient does not drink enough water  Encouraged to drink more water  Follow-up blood work in 6 months

## 2025-02-26 DIAGNOSIS — F41.9 ANXIETY: ICD-10-CM

## 2025-02-26 DIAGNOSIS — K21.00 GASTROESOPHAGEAL REFLUX DISEASE WITH ESOPHAGITIS, UNSPECIFIED WHETHER HEMORRHAGE: ICD-10-CM

## 2025-02-26 DIAGNOSIS — I10 HYPERTENSION, UNSPECIFIED TYPE: ICD-10-CM

## 2025-02-26 DIAGNOSIS — E78.5 HYPERLIPIDEMIA, UNSPECIFIED HYPERLIPIDEMIA TYPE: ICD-10-CM

## 2025-02-26 RX ORDER — SERTRALINE HYDROCHLORIDE 100 MG/1
100 TABLET, FILM COATED ORAL DAILY
Qty: 90 TABLET | Refills: 1 | Status: SHIPPED | OUTPATIENT
Start: 2025-02-26

## 2025-02-26 RX ORDER — LOSARTAN POTASSIUM 100 MG/1
100 TABLET ORAL DAILY
Qty: 90 TABLET | Refills: 1 | Status: SHIPPED | OUTPATIENT
Start: 2025-02-26 | End: 2025-08-25

## 2025-02-26 RX ORDER — AMLODIPINE BESYLATE 10 MG/1
10 TABLET ORAL DAILY
Qty: 90 TABLET | Refills: 1 | Status: SHIPPED | OUTPATIENT
Start: 2025-02-26 | End: 2025-08-25

## 2025-02-26 RX ORDER — ROSUVASTATIN CALCIUM 5 MG/1
5 TABLET, COATED ORAL DAILY
Qty: 90 TABLET | Refills: 1 | Status: SHIPPED | OUTPATIENT
Start: 2025-02-26 | End: 2025-08-25

## 2025-02-26 RX ORDER — PANTOPRAZOLE SODIUM 40 MG/1
40 TABLET, DELAYED RELEASE ORAL 2 TIMES DAILY
Qty: 180 TABLET | Refills: 1 | Status: SHIPPED | OUTPATIENT
Start: 2025-02-26 | End: 2025-08-25

## 2025-04-16 ENCOUNTER — OFFICE VISIT (OUTPATIENT)
Dept: URGENT CARE | Age: 85
End: 2025-04-16
Payer: MEDICARE

## 2025-04-16 VITALS
RESPIRATION RATE: 16 BRPM | HEIGHT: 65 IN | BODY MASS INDEX: 24.16 KG/M2 | SYSTOLIC BLOOD PRESSURE: 176 MMHG | OXYGEN SATURATION: 93 % | WEIGHT: 145 LBS | DIASTOLIC BLOOD PRESSURE: 75 MMHG | HEART RATE: 72 BPM | TEMPERATURE: 98.1 F

## 2025-04-16 DIAGNOSIS — B34.9 VIRAL INFECTION: ICD-10-CM

## 2025-04-16 DIAGNOSIS — R05.1 ACUTE COUGH: Primary | ICD-10-CM

## 2025-04-16 RX ORDER — BENZONATATE 200 MG/1
200 CAPSULE ORAL 3 TIMES DAILY PRN
Qty: 42 CAPSULE | Refills: 0 | Status: SHIPPED | OUTPATIENT
Start: 2025-04-16 | End: 2025-05-16

## 2025-04-16 RX ORDER — AMOXICILLIN 875 MG/1
875 TABLET, FILM COATED ORAL 2 TIMES DAILY
Qty: 20 TABLET | Refills: 0 | Status: SHIPPED | OUTPATIENT
Start: 2025-04-16 | End: 2025-04-26

## 2025-04-16 ASSESSMENT — ENCOUNTER SYMPTOMS: COUGH: 1

## 2025-04-16 ASSESSMENT — PAIN SCALES - GENERAL: PAINLEVEL_OUTOF10: 0-NO PAIN

## 2025-04-16 NOTE — PROGRESS NOTES
"Subjective   Patient ID: Slime Alonzo is a 84 y.o. female. They present today with a chief complaint of Cough (Patient states it started Monday and started to get a horsed voice yesterday).    History of Present Illness  Patient patient is a very pleasant 84-year-old white female, past medical Struve hypertension, hyperlipidemia, presented to clinic with complaint of cough.  Patient is reporting proximately 4-day history of upper respiratory congestion rhinorrhea postnasal drainage and cough.  She is also reporting a hoarse voice onset today.  She denies any fever or chills.  No sputum production.  No chest pain or shortness of breath.  No abdominal pain, nausea, vomiting.  No numbness tingling or focal weakness.      Cough        Past Medical History  Allergies as of 04/16/2025    (No Known Allergies)       Prescriptions Prior to Admission[1]       Medical History[2]    Surgical History[3]     reports that she has quit smoking. Her smoking use included cigarettes. She has never used smokeless tobacco. She reports current alcohol use. She reports that she does not use drugs.    Review of Systems  Review of Systems   Respiratory:  Positive for cough.                                   Objective    Vitals:    04/16/25 1851 04/16/25 1852   BP: 166/83 176/75   BP Location: Right arm Right arm   Patient Position: Sitting Sitting   Pulse: 72    Resp: 16    Temp: 36.7 °C (98.1 °F)    TempSrc: Oral    SpO2: 93%    Weight: 65.8 kg (145 lb)    Height: 1.651 m (5' 5\")      No LMP recorded. Patient has had a hysterectomy.    Physical Exam  General: Vitals Noted. No distress. Normocephalic.     HEENT: TMs normal, EOMI, normal conjunctiva, patent nares with erythematous edematous and appear nasal turbinates and clear rhinorrhea bilaterally.  Posterior oropharynx with signs of postnasal drainage without any erythema swelling or tonsillar exudate.  Uvula is in the midline and nonedematous.  No drooling.  No trismus.    Neck: " Supple with no adenopathy.     Cardiac: Regular Rate and Rhythm. No murmur.     Pulmonary: Equal breath sounds bilaterally. No wheezes, rhonchi, or rales.    Abdomen: Soft, non-tender, with normal bowel sounds.     Musculoskeletal: Moves all extremities, no effusion, no edema.     Skin: No obvious rashes.  Procedures    Point of Care Test & Imaging Results from this visit    Imaging  No results found.    Cardiology, Vascular, and Other Imaging  No other imaging results found for the past 2 days      Diagnostic study results (if any) were reviewed by Tobi Villagomez PA-C.    Assessment/Plan   Allergies, medications, history, and pertinent labs/EKGs/Imaging reviewed by Tobi Villagomez PA-C.     Medical Decision Making  Patient was seen eval the clinic for complaint of upper respiratory congestion and cough.  On exam patient is nontoxic very well-appearing/comfortably no acute distress.  Vital signs are stable, afebrile.  Chest is clear, heart is regular, belly is diffusely soft and nontender.  ENT examination as above consistent with a viral illness.  Given patient's advanced age and underlying comorbidities and medical care with a 10-day course of amoxicillin.  Will provide Tessalon Perles to be as needed for cough.  No concern for acute bronchitis or pneumonia at this time.  She will discharge home at this time.  Reviewed my impression, plan, strict return was report to ED precautions with the patient.  She expresses understanding and agreement plan of care.    Orders and Diagnoses  There are no diagnoses linked to this encounter.      Medical Admin Record      Follow Up Instructions  No follow-ups on file.    Patient disposition: Home    Electronically signed by Tobi Villagomez PA-C  7:21 PM         [1] (Not in a hospital admission)  [2]   Past Medical History:  Diagnosis Date    Depression, unspecified 06/03/2015    Depression    Essential (primary) hypertension 10/29/2022    Benign essential  hypertension    Personal history of other diseases of the respiratory system     History of chronic obstructive lung disease    Personal history of other medical treatment 07/15/2019    History of screening mammography    Personal history of other medical treatment 05/26/2018    History of screening mammography    Pure hypercholesterolemia, unspecified 10/06/2022    Hypercholesterolemia   [3]   Past Surgical History:  Procedure Laterality Date    BREAST BIOPSY  10/12/2016    Biopsy Breast Open    CYST REMOVAL      REPORTED CYST REMOVAL FALLOPIAN TUBES    HYSTERECTOMY  05/16/2013    Hysterectomy    OTHER SURGICAL HISTORY  10/12/2016    Abdominal Surgery

## 2025-04-28 ENCOUNTER — APPOINTMENT (OUTPATIENT)
Dept: NEUROLOGY | Facility: CLINIC | Age: 85
End: 2025-04-28
Payer: MEDICARE

## 2025-04-28 VITALS — BODY MASS INDEX: 23.96 KG/M2 | WEIGHT: 144 LBS

## 2025-04-28 DIAGNOSIS — G51.31 CLONIC HEMIFACIAL SPASM, RIGHT: Primary | ICD-10-CM

## 2025-04-28 PROCEDURE — 64612 DESTROY NERVE FACE MUSCLE: CPT | Performed by: PSYCHIATRY & NEUROLOGY

## 2025-04-28 NOTE — PROGRESS NOTES
Subjective   Slime Alonzo is an 84 y.o. female here for medical botulinum injection for hemifacial spasm.    [facility]     Patient reports that the last injections was fair. Wore off 4 weeks ago. Having more eye closure and the cheek twitches are more. and  She wanted to try raising the dose around the eye.    Has eye closure when she eats and right facial pulling. Previously had facial weakness from higher doses but no ptosis or upper face side effects. She is generally bothered by the facial weakness, and preferred to continue to skip the zygomaticus injection.  She also wanted to eliminate the mentalis injection because she did not think she likely needs it.        Objective   Neurological Exam    Exam: R eye clonic spasms and R facial pulling   Physical Exam  Procedures    Prior to the start of the procedure a time out was taken and the following were verified: the identity of the patient using two patient identifiers (name, ) - this was verified by the patient    Written informed consent was re-obtained.    The patient was prepped in the usual sterile fashion. OnabotulinumtoxinA (Botox) was injected as follows:    Total dose Muscle    8 units             right superomedial orbicularis oculi    8 units              right superolateral orbicularis oculi   12 units  right lateral orbicularis oculi   10 units  right inferolateral orbicularis oculi   10 units  right inferior orbicularis oculi   2 units             right pretarsal (far medial on eyelid 1 unit and far lateral 1 unit)  0 unit               right zygomaticus   1 unit               right mentalis (+1, consider adding more as mentalis spasms were prominent)    Total amount of botulinum toxin used was 51 units.  Total amount discarded was 49 units.  Total billed was 100 units.    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 She will return in  approximately 3 months for repeat injections. We can consider raising the pretarsal doses next time if necessary.

## 2025-07-28 ENCOUNTER — APPOINTMENT (OUTPATIENT)
Dept: NEUROLOGY | Facility: CLINIC | Age: 85
End: 2025-07-28
Payer: MEDICARE

## 2025-07-28 VITALS — BODY MASS INDEX: 24.3 KG/M2 | WEIGHT: 146 LBS

## 2025-07-28 DIAGNOSIS — G51.31 CLONIC HEMIFACIAL SPASM, RIGHT: Primary | ICD-10-CM

## 2025-07-28 PROCEDURE — 64612 DESTROY NERVE FACE MUSCLE: CPT | Performed by: PSYCHIATRY & NEUROLOGY

## 2025-07-28 NOTE — PROGRESS NOTES
Subjective   Slime Alonzo is an 84 y.o. female here for medical botulinum injection for hemifacial spasm.    [facility]     Patient reports that the last injections was fair. Wore off about 2 weeks ago. Having more eye closure and the cheek twitches are more. and  She wanted to try treating the zygomaticus again - I reminded her that she had facial weakness as a side effect last time, but she still wanted to try it.    Has eye closure when she eats and right facial pulling. Previously had facial weakness from higher doses but no ptosis or upper face side effects. She is generally bothered by the facial weakness.        Objective   Neurological Exam    Exam: R eye clonic spasms and R facial pulling   Physical Exam  Procedures    Prior to the start of the procedure a time out was taken and the following were verified: the identity of the patient using two patient identifiers (name, ) - this was verified by the patient    The patient was prepped in the usual sterile fashion. OnabotulinumtoxinA (Botox) was injected as follows:    Total dose Muscle    8 units             right superomedial orbicularis oculi    8 units              right superolateral orbicularis oculi   12 units  right lateral orbicularis oculi   10 units  right inferolateral orbicularis oculi   10 units  right inferior orbicularis oculi   2 units             right pretarsal (far medial on eyelid 1 unit and far lateral 1 unit)  0.5 unit               right zygomaticus (+0.5)  1 unit               right mentalis (consider adding more as mentalis spasms were prominent)    Total amount of botulinum toxin used was 51.5 units.  Total amount discarded was 48.5 units.  Total billed was 100 units.    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 She will return in approximately 3 months for repeat injections. We can consider raising the pretarsal doses  next time if necessary.  We can also consider raising mentalis.

## 2025-08-12 LAB
25(OH)D3+25(OH)D2 SERPL-MCNC: 24 NG/ML (ref 30–100)
ALBUMIN SERPL-MCNC: 4.7 G/DL (ref 3.6–5.1)
ALP SERPL-CCNC: 54 U/L (ref 37–153)
ALT SERPL-CCNC: 13 U/L (ref 6–29)
ANION GAP SERPL CALCULATED.4IONS-SCNC: 9 MMOL/L (CALC) (ref 7–17)
AST SERPL-CCNC: 17 U/L (ref 10–35)
BILIRUB SERPL-MCNC: 0.6 MG/DL (ref 0.2–1.2)
BUN SERPL-MCNC: 15 MG/DL (ref 7–25)
CALCIUM SERPL-MCNC: 9.5 MG/DL (ref 8.6–10.4)
CHLORIDE SERPL-SCNC: 99 MMOL/L (ref 98–110)
CHOLEST SERPL-MCNC: 170 MG/DL
CHOLEST/HDLC SERPL: 2.3 (CALC)
CO2 SERPL-SCNC: 32 MMOL/L (ref 20–32)
CREAT SERPL-MCNC: 0.56 MG/DL (ref 0.6–0.95)
EGFRCR SERPLBLD CKD-EPI 2021: 89 ML/MIN/1.73M2
ERYTHROCYTE [DISTWIDTH] IN BLOOD BY AUTOMATED COUNT: 12.6 % (ref 11–15)
GLUCOSE SERPL-MCNC: 113 MG/DL (ref 65–99)
HCT VFR BLD AUTO: 45.2 % (ref 35–45)
HDLC SERPL-MCNC: 73 MG/DL
HGB BLD-MCNC: 14.7 G/DL (ref 11.7–15.5)
LDLC SERPL CALC-MCNC: 70 MG/DL (CALC)
MCH RBC QN AUTO: 28.4 PG (ref 27–33)
MCHC RBC AUTO-ENTMCNC: 32.5 G/DL (ref 32–36)
MCV RBC AUTO: 87.4 FL (ref 80–100)
NONHDLC SERPL-MCNC: 97 MG/DL (CALC)
PLATELET # BLD AUTO: 174 THOUSAND/UL (ref 140–400)
PMV BLD REES-ECKER: 11.8 FL (ref 7.5–12.5)
POTASSIUM SERPL-SCNC: 4.5 MMOL/L (ref 3.5–5.3)
PROT SERPL-MCNC: 6.8 G/DL (ref 6.1–8.1)
RBC # BLD AUTO: 5.17 MILLION/UL (ref 3.8–5.1)
SODIUM SERPL-SCNC: 140 MMOL/L (ref 135–146)
TRIGL SERPL-MCNC: 202 MG/DL
TSH SERPL-ACNC: 1.91 MIU/L (ref 0.4–4.5)
WBC # BLD AUTO: 6.3 THOUSAND/UL (ref 3.8–10.8)

## 2025-08-13 ENCOUNTER — APPOINTMENT (OUTPATIENT)
Dept: PRIMARY CARE | Facility: CLINIC | Age: 85
End: 2025-08-13
Payer: MEDICARE

## 2025-08-26 ENCOUNTER — APPOINTMENT (OUTPATIENT)
Dept: PRIMARY CARE | Facility: CLINIC | Age: 85
End: 2025-08-26
Payer: MEDICARE

## 2025-08-26 ENCOUNTER — HOSPITAL ENCOUNTER (OUTPATIENT)
Dept: RADIOLOGY | Facility: CLINIC | Age: 85
Discharge: HOME | End: 2025-08-26
Payer: MEDICARE

## 2025-08-26 DIAGNOSIS — M25.512 LEFT SHOULDER PAIN, UNSPECIFIED CHRONICITY: ICD-10-CM

## 2025-08-26 PROCEDURE — 73030 X-RAY EXAM OF SHOULDER: CPT | Mod: LEFT SIDE | Performed by: RADIOLOGY

## 2025-08-26 PROCEDURE — 73030 X-RAY EXAM OF SHOULDER: CPT | Mod: LT

## 2025-08-27 DIAGNOSIS — Z12.11 SCREENING FOR COLON CANCER: ICD-10-CM

## 2025-08-27 RX ORDER — SODIUM, POTASSIUM,MAG SULFATES 17.5-3.13G
SOLUTION, RECONSTITUTED, ORAL ORAL
Qty: 360 ML | Refills: 0 | Status: SHIPPED | OUTPATIENT
Start: 2025-08-27

## 2025-11-03 ENCOUNTER — APPOINTMENT (OUTPATIENT)
Dept: NEUROLOGY | Facility: CLINIC | Age: 85
End: 2025-11-03
Payer: MEDICARE

## 2025-11-10 ENCOUNTER — APPOINTMENT (OUTPATIENT)
Dept: NEUROLOGY | Facility: CLINIC | Age: 85
End: 2025-11-10
Payer: MEDICARE